# Patient Record
Sex: FEMALE | Race: BLACK OR AFRICAN AMERICAN | Employment: FULL TIME | URBAN - METROPOLITAN AREA
[De-identification: names, ages, dates, MRNs, and addresses within clinical notes are randomized per-mention and may not be internally consistent; named-entity substitution may affect disease eponyms.]

---

## 2019-05-01 ENCOUNTER — HOSPITAL ENCOUNTER (EMERGENCY)
Age: 34
Discharge: HOME | End: 2019-05-01
Attending: EMERGENCY MEDICINE
Payer: COMMERCIAL

## 2019-05-01 ENCOUNTER — APPOINTMENT (OUTPATIENT)
Dept: GENERAL RADIOLOGY | Age: 34
End: 2019-05-01
Payer: COMMERCIAL

## 2019-05-01 ENCOUNTER — TELEPHONE (OUTPATIENT)
Dept: ADMINISTRATIVE | Age: 34
End: 2019-05-01

## 2019-05-01 VITALS
TEMPERATURE: 97.2 F | WEIGHT: 220 LBS | OXYGEN SATURATION: 100 % | HEART RATE: 88 BPM | DIASTOLIC BLOOD PRESSURE: 78 MMHG | HEIGHT: 64 IN | RESPIRATION RATE: 16 BRPM | SYSTOLIC BLOOD PRESSURE: 132 MMHG | BODY MASS INDEX: 37.56 KG/M2

## 2019-05-01 DIAGNOSIS — S62.633B OPEN DISPLACED FRACTURE OF DISTAL PHALANX OF LEFT MIDDLE FINGER, INITIAL ENCOUNTER: Primary | ICD-10-CM

## 2019-05-01 PROCEDURE — 90471 IMMUNIZATION ADMIN: CPT | Performed by: STUDENT IN AN ORGANIZED HEALTH CARE EDUCATION/TRAINING PROGRAM

## 2019-05-01 PROCEDURE — 2500000003 HC RX 250 WO HCPCS: Performed by: STUDENT IN AN ORGANIZED HEALTH CARE EDUCATION/TRAINING PROGRAM

## 2019-05-01 PROCEDURE — 90715 TDAP VACCINE 7 YRS/> IM: CPT | Performed by: STUDENT IN AN ORGANIZED HEALTH CARE EDUCATION/TRAINING PROGRAM

## 2019-05-01 PROCEDURE — 73130 X-RAY EXAM OF HAND: CPT

## 2019-05-01 PROCEDURE — 12001 RPR S/N/AX/GEN/TRNK 2.5CM/<: CPT

## 2019-05-01 PROCEDURE — 99283 EMERGENCY DEPT VISIT LOW MDM: CPT

## 2019-05-01 PROCEDURE — 96374 THER/PROPH/DIAG INJ IV PUSH: CPT

## 2019-05-01 PROCEDURE — 6360000002 HC RX W HCPCS: Performed by: STUDENT IN AN ORGANIZED HEALTH CARE EDUCATION/TRAINING PROGRAM

## 2019-05-01 RX ORDER — BUPIVACAINE HYDROCHLORIDE 5 MG/ML
30 INJECTION, SOLUTION EPIDURAL; INTRACAUDAL ONCE
Status: COMPLETED | OUTPATIENT
Start: 2019-05-01 | End: 2019-05-01

## 2019-05-01 RX ORDER — ONDANSETRON 2 MG/ML
INJECTION INTRAMUSCULAR; INTRAVENOUS
Status: DISCONTINUED
Start: 2019-05-01 | End: 2019-05-01 | Stop reason: HOSPADM

## 2019-05-01 RX ORDER — OXYCODONE HYDROCHLORIDE AND ACETAMINOPHEN 5; 325 MG/1; MG/1
1 TABLET ORAL EVERY 6 HOURS PRN
Qty: 12 TABLET | Refills: 0 | Status: SHIPPED | OUTPATIENT
Start: 2019-05-01 | End: 2019-05-08 | Stop reason: SDUPTHER

## 2019-05-01 RX ORDER — LISINOPRIL AND HYDROCHLOROTHIAZIDE 25; 20 MG/1; MG/1
1 TABLET ORAL DAILY
COMMUNITY

## 2019-05-01 RX ORDER — CEPHALEXIN 500 MG/1
500 CAPSULE ORAL 3 TIMES DAILY
Qty: 21 CAPSULE | Refills: 0 | Status: SHIPPED | OUTPATIENT
Start: 2019-05-01 | End: 2019-05-08

## 2019-05-01 RX ORDER — MORPHINE SULFATE 4 MG/ML
4 INJECTION, SOLUTION INTRAMUSCULAR; INTRAVENOUS ONCE
Status: COMPLETED | OUTPATIENT
Start: 2019-05-01 | End: 2019-05-01

## 2019-05-01 RX ADMIN — BUPIVACAINE HYDROCHLORIDE 150 MG: 5 INJECTION, SOLUTION EPIDURAL; INTRACAUDAL at 08:57

## 2019-05-01 RX ADMIN — TETANUS TOXOID, REDUCED DIPHTHERIA TOXOID AND ACELLULAR PERTUSSIS VACCINE, ADSORBED 0.5 ML: 5; 2.5; 8; 8; 2.5 SUSPENSION INTRAMUSCULAR at 10:01

## 2019-05-01 RX ADMIN — MORPHINE SULFATE 4 MG: 4 INJECTION INTRAVENOUS at 08:52

## 2019-05-01 ASSESSMENT — ENCOUNTER SYMPTOMS
EYE PAIN: 0
COLOR CHANGE: 0
ABDOMINAL PAIN: 0
SHORTNESS OF BREATH: 0

## 2019-05-01 ASSESSMENT — PAIN SCALES - GENERAL
PAINLEVEL_OUTOF10: 10
PAINLEVEL_OUTOF10: 10
PAINLEVEL_OUTOF10: 8

## 2019-05-01 ASSESSMENT — PAIN DESCRIPTION - PAIN TYPE: TYPE: ACUTE PAIN

## 2019-05-01 ASSESSMENT — PAIN DESCRIPTION - LOCATION: LOCATION: HAND

## 2019-05-01 ASSESSMENT — PAIN DESCRIPTION - ORIENTATION: ORIENTATION: LEFT

## 2019-05-01 NOTE — ED NOTES
Pt left ring finger as well as middle finger dressed with vaseline gauze 4x4 and wrapped with kerlix and small finger splint applied to pt middle finger pt tolerated well     Bennie Root RN  05/01/19 [id number]

## 2019-05-01 NOTE — TELEPHONE ENCOUNTER
Patient seen in the ED 5/1 for left hand injury. . Pt to follow up w/ Dr Bishop Covert. Call transferred to the office.  Best number to reach pt at is [phone]

## 2019-05-01 NOTE — ED PROVIDER NOTES
The patient is a 25-year-old female presents today to the ER with chief complaint of left hand pain. 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 She states that she hasn't looked at it since because she is too afraid to but that it hurts very much and that it was bleeding. She states the primary pain is in her left 3rd and 4th digits. Patient states she has no idea when she last received a tetanus shot. The history is provided by the patient. Hand Injury   This is a new problem. The current episode started less than 1 hour ago. The problem occurs constantly. The problem has not changed since onset. Pertinent negatives include no chest pain, no abdominal pain, no headaches and no shortness of breath. Review of Systems   Constitutional: Negative for chills and diaphoresis. HENT: Negative for dental problem. Eyes: Negative for pain. Respiratory: Negative for shortness of breath. Cardiovascular: Negative for chest pain. Gastrointestinal: Negative for abdominal pain. Endocrine: Negative for polyuria. Genitourinary: Negative for dyspareunia. Musculoskeletal: Negative for gait problem. Skin: Negative for color change. Allergic/Immunologic: Negative for immunocompromised state. Neurological: Negative for headaches. Hematological: Negative for adenopathy. Does not bruise/bleed easily. Psychiatric/Behavioral: Negative for behavioral problems. Physical Exam   Constitutional: She is oriented to person, place, and time. She appears well-developed and well-nourished. She is cooperative. HENT:   Head: Normocephalic and atraumatic.    Right Ear: Hearing and external ear normal.   Left Ear: Hearing and external ear normal.   Nose: Nose normal.   Mouth/Throat: Uvula is midline and oropharynx is clear and moist.   Eyes: Conjunctivae and lids are normal.   Neck: Trachea normal and full passive range of motion without pain. Cardiovascular: Normal rate, regular rhythm and normal heart sounds. Pulmonary/Chest: Effort normal and breath sounds normal.   Abdominal: Soft. Normal appearance and bowel sounds are normal. There is no tenderness. Musculoskeletal:        Hands:  Neurological: She is alert and oriented to person, place, and time. GCS eye subscore is 4. GCS verbal subscore is 5. GCS motor subscore is 6. Skin: Skin is warm, dry and intact. Psychiatric: She has a normal mood and affect. Her speech is normal and behavior is normal. Judgment and thought content normal. Cognition and memory are normal.                Procedures  Laceration Repair Procedure Note    Indication: Laceration/crush injury    Procedure: The patient was placed in the appropriate position and anesthesia around the laceration was obtained by infiltration using digital nerve block with 0.5% bupivacaine. The area was then cleansed with betadine and draped in a sterile fashion and irrigated with high-pressure sterile water. The laceration was closed with 5-0 Vicryl using interrupted sutures. There was also injury to the left nailbed, remaining portion of nail was removed. The wound area was then dressed with a sterile dressing and splint. Minimal debridement was preformed, flaps were aligned. No foreign body was identified. Total repaired wound length: 1 cm. Other Items: Suture count: 9    The patient tolerated the procedure well. Complications: None                MDM  Number of Diagnoses or Management Options  Open displaced fracture of distal phalanx of left middle finger, initial encounter:   Diagnosis management comments: Patient's EKG showed Comminuted left third distal phalanx fracture, patient's wounds were cleaned, irrigated, and repaired. Patient was given a tetanus booster. Patient sent home with prescriptions for Keflex and Percocet. Patient given follow-up with orthopedic surgeon.  All patient's questions have been answered at this time. Patient to be discharged home with instructions to follow with the orthopedic surgeon. Patient verbalizes understanding of plan and is agreeable to discharge at this time. --------------------------------------------- PAST HISTORY ---------------------------------------------  Past Medical History:  has a past medical history of Asthma and Hypertension. Past Surgical History:  has a past surgical history that includes LEEP. Social History:  reports that she has been smoking cigarettes. She has been smoking about 0.50 packs per day. She has never used smokeless tobacco. She reports that she drinks alcohol. She reports that she does not use drugs. Family History: family history is not on file. The patients home medications have been reviewed. Allergies: Patient has no known allergies. -------------------------------------------------- RESULTS -------------------------------------------------  Labs:  No results found for this visit on 05/01/19. Radiology:  XR HAND LEFT (MIN 3 VIEWS)   Final Result      Comminuted left third distal phalanx fracture with associated soft   tissue swelling. David Scott ------------------------- NURSING NOTES AND VITALS REVIEWED ---------------------------  Date / Time Roomed:  5/1/2019  8:16 AM  ED Bed Assignment:  23/23    The nursing notes within the ED encounter and vital signs as below have been reviewed. BP (!) 149/83   Pulse 97   Temp 97.1 °F (36.2 °C)   Resp 20   Ht 5' 4\" (1.626 m)   Wt 220 lb (99.8 kg)   LMP 04/12/2019 (Exact Date)   SpO2 100%   BMI 37.76 kg/m²   Oxygen Saturation Interpretation: Normal      ------------------------------------------ PROGRESS NOTES ------------------------------------------  11:02 AM  I have spoken with the patient and discussed todays results, in addition to providing specific details for the plan of care and counseling regarding the diagnosis and prognosis.   Their questions are answered at this time and they are agreeable with the plan. I discussed at length with them reasons for immediate return here for re evaluation. They will followup with their orthopedic physician by calling their office tomorrow. --------------------------------- ADDITIONAL PROVIDER NOTES ---------------------------------  At this time the patient is without objective evidence of an acute process requiring hospitalization or inpatient management. They have remained hemodynamically stable throughout their entire ED visit and are stable for discharge with outpatient follow-up. The plan has been discussed in detail and they are aware of the specific conditions for emergent return, as well as the importance of follow-up. New Prescriptions    CEPHALEXIN (KEFLEX) 500 MG CAPSULE    Take 1 capsule by mouth 3 times daily for 7 days    OXYCODONE-ACETAMINOPHEN (PERCOCET) 5-325 MG PER TABLET    Take 1 tablet by mouth every 6 hours as needed for Pain for up to 3 days. Intended supply: 3 days. Take lowest dose possible to manage pain       Diagnosis:  1. Open displaced fracture of distal phalanx of left middle finger, initial encounter        Disposition:  Patient's disposition: Discharge to home  Patient's condition is stable.               Melba Gomez DO  Resident  05/01/19 0601

## 2019-05-01 NOTE — ED NOTES
PT NOT ABLE TO BE DISCHARGED UNTIL LAB OBTAINS URINE DRUG SCREEN.       Juan Jose Jain RN  05/01/19 3102

## 2019-05-03 ENCOUNTER — TELEPHONE (OUTPATIENT)
Dept: ADMINISTRATIVE | Age: 34
End: 2019-05-03

## 2019-05-03 NOTE — TELEPHONE ENCOUNTER
Patient called back again needing to make her follow up appointment. She didn't want to be transferred again. Please call at [phone].

## 2019-05-07 DIAGNOSIS — S69.92XA HAND INJURY, LEFT, INITIAL ENCOUNTER: Primary | ICD-10-CM

## 2019-05-08 ENCOUNTER — OFFICE VISIT (OUTPATIENT)
Dept: ORTHOPEDIC SURGERY | Age: 34
End: 2019-05-08
Payer: COMMERCIAL

## 2019-05-08 ENCOUNTER — HOSPITAL ENCOUNTER (OUTPATIENT)
Dept: GENERAL RADIOLOGY | Age: 34
Discharge: HOME | End: 2019-05-10
Payer: COMMERCIAL

## 2019-05-08 VITALS
WEIGHT: 220 LBS | BODY MASS INDEX: 38.98 KG/M2 | SYSTOLIC BLOOD PRESSURE: 125 MMHG | HEIGHT: 63 IN | HEART RATE: 63 BPM | DIASTOLIC BLOOD PRESSURE: 76 MMHG

## 2019-05-08 DIAGNOSIS — S62.633B OPEN DISPLACED FRACTURE OF DISTAL PHALANX OF LEFT MIDDLE FINGER, INITIAL ENCOUNTER: ICD-10-CM

## 2019-05-08 DIAGNOSIS — S69.92XA HAND INJURY, LEFT, INITIAL ENCOUNTER: ICD-10-CM

## 2019-05-08 PROCEDURE — 73130 X-RAY EXAM OF HAND: CPT

## 2019-05-08 PROCEDURE — 99212 OFFICE O/P EST SF 10 MIN: CPT

## 2019-05-08 PROCEDURE — 99203 OFFICE O/P NEW LOW 30 MIN: CPT | Performed by: ORTHOPAEDIC SURGERY

## 2019-05-08 PROCEDURE — 26755 TREAT FINGER FRACTURE EACH: CPT | Performed by: ORTHOPAEDIC SURGERY

## 2019-05-08 RX ORDER — IBUPROFEN 800 MG/1
800 TABLET ORAL EVERY 8 HOURS PRN
Qty: 60 TABLET | Refills: 0 | Status: SHIPPED | OUTPATIENT
Start: 2019-05-08 | End: 2019-10-16 | Stop reason: ALTCHOICE

## 2019-05-08 RX ORDER — OXYCODONE HYDROCHLORIDE AND ACETAMINOPHEN 5; 325 MG/1; MG/1
1 TABLET ORAL EVERY 8 HOURS PRN
Qty: 21 TABLET | Refills: 0 | Status: SHIPPED | OUTPATIENT
Start: 2019-05-08 | End: 2019-05-15

## 2019-05-08 RX ORDER — RANITIDINE 150 MG/1
TABLET ORAL
Refills: 1 | COMMUNITY
Start: 2019-02-20

## 2019-05-08 NOTE — PATIENT INSTRUCTIONS
Nonweightbearing to left hand  Warm water soaks with antibacterial soap for 15-20 minutes twice daily, make sure to move hand around in the water  Dry dressings to finger- recommend no more Neosporin.  OK to get dressings wet when taking them off  Finish the remainder of antibiotics  Pain medicine sent to pharmacy  Continue use of splint to left middle finger  Work restriction letter  Follow up in 2 weeks for recheck    If you start having uncontrollable pain, new numbness/tingling, foul smelling drainage or fevers contact the office ASAP or go to the ED for reevaluation

## 2019-05-08 NOTE — PROGRESS NOTES
New Patient Orthopaedic Progress Note    Mike Sepulveda is a 26 y.o. female, her YOB: 1985 with the following history as recorded in [facility]:      Patient Active Problem List    Diagnosis Date Noted    Open displaced fracture of distal phalanx of left middle finger 05/08/2019     Current Outpatient Medications   Medication Sig Dispense Refill    ranitidine (ZANTAC) 150 MG tablet TAKE 1 TABLET BY MOUTH TWICE A DAY  1    oxyCODONE-acetaminophen (PERCOCET) 5-325 MG per tablet Take 1 tablet by mouth every 8 hours as needed for Pain for up to 7 days. Intended supply: 7 days. Take lowest dose possible to manage pain 21 tablet 0    ibuprofen (IBU) 800 MG tablet Take 1 tablet by mouth every 8 hours as needed for Pain Take with food 60 tablet 0    lisinopril-hydrochlorothiazide (PRINZIDE;ZESTORETIC) 20-25 MG per tablet Take 1 tablet by mouth daily      cephALEXin (KEFLEX) 500 MG capsule Take 1 capsule by mouth 3 times daily for 7 days 21 capsule 0    ibuprofen (IBU) 800 MG tablet Take 1 tablet by mouth every 8 hours as needed for Pain for 7 days. 21 tablet 0     No current facility-administered medications for this visit. Allergies: Patient has no known allergies. Past Medical History:   Diagnosis Date    Asthma     Hypertension      Past Surgical History:   Procedure Laterality Date    LEEP       History reviewed. No pertinent family history.   Social History     Tobacco Use    Smoking status: Current Every Day Smoker     Packs/day: 0.50     Types: Cigarettes    Smokeless tobacco: Never Used   Substance Use Topics    Alcohol use: Yes     Comment: occasional                             Chief Complaint   Patient presents with    Finger Injury     left 3rd and 4th fingers injured at work; DOI 5/1/19; steel press came down onto her fingers; machine malfunctioned;     Other     works at [employer]     left 3rd and 5th fingers       SUBJECTIVE: Patient is a 25-year-old status: alert, oriented to person, place, and time, normal mood, behavior, speech, dress, motor activity, and thought processes  Abdomen: soft, nondistended  Resp:   resp easy and unlabored, no audible wheezes note, normal symmetrical expansion of both hemithoraces  Cardiac: distal pulses palpable, skin and extremities well perfused  Neurological: alert, oriented X3, normal speech, no focal findings or movement disorder noted, motor and sensory grossly normal bilaterally, normal muscle tone, no tremors, strength 5/5, normal gait and station  HEENT: normochephalic atraumatic, external ears and eyes normal, sclera normal, neck supple, no nasal discharge. Extremities:   peripheral pulses normal, no edema, redness or tenderness in the calves   Skin: normal coloration, no rashes or open wounds, no suspicious skin lesions noted  Psych: Affect euthymic   Musculoskeletal:   Extremity:  Left Upper Extremity  Skin is clean dry and intact  Mild edema noted over the middle and ring finger DIP  Radial pulse palpable, fingers warm with BCR  Flex/extension intact to wrist, thumb and fingers, known fracture motion at DIP of middle finger and not tested  Subjectively states sensation intact to radial/medial/ulnar distribution  No active bleeding at nail bed  Skin surrounding nail bed mildly macerated see image below    **Informed Consent**    The patient has given verbal consent to have photos taken of left hand and inserted into their medical chart as part of their permanent medical record for purposes of documentation, treatment management and/or medical review.    All Images taken on 5/8/2019  of patient name: Karen Anguiano were transmitted and stored on secured [employer] located within [facility] by a registered [employer] Mobile Application Device               /76 (Site: Right Upper Arm, Position: Sitting, Cuff Size: Medium Adult)   Pulse 63   Ht 5' 3\" (1.6 m)   Wt 220 lb (99.8 kg)   LMP 04/12/2019 BMI 38.97 kg/m²      XR: 5/8/19 x-rays the left hand demonstrate displaced distal phalanx fracture of the middle finger. This does appear to extend into the DIP joint. No new fracture or dislocation noted on x-rays. ASSESSMENT:     Diagnosis Orders   1. Open displaced fracture of distal phalanx of left middle finger, initial encounter  oxyCODONE-acetaminophen (PERCOCET) 5-325 MG per tablet    ibuprofen (IBU) 800 MG tablet       Discussion: Dr. Kathryn Montana and I lengthy discussion with patient regarding her diagnosis, typical prognosis, and expected outcomes. I reviewed the possible complications from the injury itself despite treatment choosen. I also discussed treatment options including nonoperative managements versus surgical management, along with risks and benefits of each. Patient would like to continue with conservative treatment. We did discuss length of time typically off work of at least 4-6 weeks, as patient works at [employer] and utilizes both hands for fine motor tasks. PLAN:  Nonweightbearing to left hand  Warm water soaks with antibacterial soap for 15-20 minutes twice daily, make sure to move hand around in the water  Dry dressings to finger- recommend no more Neosporin. OK to get dressings wet when taking them off  Finish the remainder of antibiotics  Pain medicine sent to pharmacy  Continue use of splint to left middle finger  Work restriction letter provided  Follow up in 2 weeks for recheck    If you start having uncontrollable pain, new numbness/tingling, foul smelling drainage or fevers contact the office ASAP or go to the ED for reevaluation    Controlled Substances Monitoring:     RX Monitoring 5/8/2019   Attestation The Prescription Monitoring Report for this patient was reviewed today.    Chronic Pain Routine Monitoring No signs of potential drug abuse or diversion identified: otherwise, see note documentation     Electronically signed by Antwon Ann MD on 5/8/2019 at 1:30 PM        I have seen and evaluated the patient and agree with the above assessment on today's visit. I have performed the key components of the history and physical examination and concur completely with the findings and plans as documented. Agree with ROS, examination, FMH, PMH, PSH, SocHx, and allergies as above. Patient physically seen and examined. Patient was injured at work on 5/1/2019 when she smashed her left middle finger. She had a nailbed injury repair by the emergency department. She was sent this for definitive management. She also has an open tuft fracture. She is seen along with the PA today in the office. Patient does have coverage of her distal phalanx. On examination her sensation is intact. It appears that she is healing well. We went over treatment plan with her including soaks etc. She'll most like to be off work between 4 and 6 weeks. She understands this. We'll see her back in 2 weeks for wound check. She is agreeable to plan.       Mary Abernathy MD

## 2019-05-08 NOTE — LETTER
[address]  [facility] [address]  Phone: [phone]  Fax: [phone] Wilbert Bentley MD        May 8, 2019     Patient: Mauri Zimmerman   YOB: 1985   Date of Visit: 5/8/2019       To Whom It May Concern: It is my medical opinion that Mauri Zimmerman may return to light duty immediately with the following restrictions: no use of left hand. Must keep splint on left middle finger. To keep left hand clean and dry. If you have any questions or concerns, please don't hesitate to call.     Sincerely,        Martita Quevedo MD

## 2019-05-24 ENCOUNTER — TELEPHONE (OUTPATIENT)
Dept: ADMINISTRATIVE | Age: 34
End: 2019-05-24

## 2019-05-24 NOTE — TELEPHONE ENCOUNTER
[employer] called stating that patient needed a follow up appointment. She needed to be seen around 5/24 but she did not make the appointment. She is scheduled on 6/14 at 8:30.which was next available. If she should be seen sooner, please call her. (this is a worker's comp).

## 2019-05-24 NOTE — TELEPHONE ENCOUNTER
Spoke with pt re appt for 6/14--told her she needs to come in sooner to have her wound checked--appt changed to 5/29--reminded her to make sure she makes appt for f/u prior to leaving office to be sure she gets appt in appropriate time frame

## 2019-05-28 DIAGNOSIS — S62.633D OPEN DISPLACED FRACTURE OF DISTAL PHALANX OF LEFT MIDDLE FINGER WITH ROUTINE HEALING, SUBSEQUENT ENCOUNTER: Primary | ICD-10-CM

## 2019-05-29 ENCOUNTER — HOSPITAL ENCOUNTER (OUTPATIENT)
Dept: GENERAL RADIOLOGY | Age: 34
Discharge: HOME | End: 2019-05-31
Payer: COMMERCIAL

## 2019-05-29 ENCOUNTER — OFFICE VISIT (OUTPATIENT)
Dept: ORTHOPEDIC SURGERY | Age: 34
End: 2019-05-29
Payer: COMMERCIAL

## 2019-05-29 VITALS
WEIGHT: 220 LBS | SYSTOLIC BLOOD PRESSURE: 126 MMHG | TEMPERATURE: 98 F | BODY MASS INDEX: 37.56 KG/M2 | DIASTOLIC BLOOD PRESSURE: 76 MMHG | HEART RATE: 95 BPM | HEIGHT: 64 IN

## 2019-05-29 DIAGNOSIS — S62.633D OPEN DISPLACED FRACTURE OF DISTAL PHALANX OF LEFT MIDDLE FINGER WITH ROUTINE HEALING, SUBSEQUENT ENCOUNTER: Primary | ICD-10-CM

## 2019-05-29 DIAGNOSIS — S62.633D OPEN DISPLACED FRACTURE OF DISTAL PHALANX OF LEFT MIDDLE FINGER WITH ROUTINE HEALING, SUBSEQUENT ENCOUNTER: ICD-10-CM

## 2019-05-29 PROCEDURE — 73130 X-RAY EXAM OF HAND: CPT

## 2019-05-29 PROCEDURE — 99024 POSTOP FOLLOW-UP VISIT: CPT | Performed by: PHYSICIAN ASSISTANT

## 2019-05-29 PROCEDURE — 99212 OFFICE O/P EST SF 10 MIN: CPT

## 2019-05-29 NOTE — PROGRESS NOTES
DOI: 5/1/19 open displaced distal phalanx fracture, non-op    Subjective:  Trish Tate is approximately 4 weeks out from the above mentioned procedure. Continues to do twice daily soaks of the hand, with dressing changes. Remains AlumaFoam splint. Has remained nonweightbearing, continues working light duty. Denies any new complaints with returning back to work with light duty restrictions. Denies new orthopedic planes or paresthesias. Denies fevers or chills. Pain is controlled. Review of Systems -    General ROS: negative for - chills, fatigue, fever or night sweats  Respiratory ROS: no cough, shortness of breath, or wheezing  Cardiovascular ROS: no chest pain or dyspnea on exertion  Gastrointestinal ROS: no abdominal pain, nausea, vomiting, diarrhea, constipation,or black or bloody stools  Genitourinary: no hematuria, dysuria, or incontinence   Musculoskeletal ROS: negative for -back or neck pain or stiffness, also see HPI  Neurological ROS: no TIA or stroke symptoms       Objective:    General: Alert and oriented X 3, normocephalic atraumatic, external ears and eye normal, sclera clear, no acute distress, respirations easy and unlabored with no audible wheezes, skin warm and dry, speech and dress appropriate for noted age, affect euthymic.     Extremity:  Left Upper Extremity  Skin is clean dry and intact  Mild edema noted over the middle and ring finger DIP  Radial pulse palpable, fingers warm with BCR  Flex/extension intact to wrist, thumb and fingers, unable to make a full fist due to pain  FDS/FDP, EDC of the middle and ring finger intact   Subjectively states sensation intact to radial/medial/ulnar distribution  No active bleeding at nail bed, eschar noted over nail bed site healing no signs of infection  Skin surrounding nail bed mildly macerated again today     /76 (Site: Left Upper Arm, Position: Sitting, Cuff Size: Medium Adult)   Pulse 95   Temp 98 °F (36.7 °C) (Oral)   Ht 5' 4\"

## 2019-06-03 ENCOUNTER — HOSPITAL ENCOUNTER (OUTPATIENT)
Dept: OCCUPATIONAL THERAPY | Age: 34
Setting detail: THERAPIES SERIES
Discharge: HOME | End: 2019-06-03
Payer: COMMERCIAL

## 2019-06-03 PROCEDURE — 97140 MANUAL THERAPY 1/> REGIONS: CPT | Performed by: OCCUPATIONAL THERAPIST

## 2019-06-03 PROCEDURE — 97165 OT EVAL LOW COMPLEX 30 MIN: CPT | Performed by: OCCUPATIONAL THERAPIST

## 2019-06-03 PROCEDURE — 97110 THERAPEUTIC EXERCISES: CPT | Performed by: OCCUPATIONAL THERAPIST

## 2019-06-03 NOTE — PROGRESS NOTES
tips     Dynamometer (setting 2): deferred    Left: TBA      Right: TBA    Pinch Meter:   Lateral: Left= TBA, Right= TBA    Palmar 3 point: Left= TBA, Right= TBA  9 Hole Peg Test:   Left: 2 min   Right: WNL    Intervention: soft tissue mobilization, arom, prom, soft tissue mobilization, beginning hep provided. Assessment of current deficits   Functional mobility [x]  ADLs [x] Strength [x]  Cognition []  Functional transfers  [] IADLs [] Safety Awareness [x]  Endurance [x]  Fine Motor Coordination [x] Balance [] Vision/perception [] Sensation [x]   Gross Motor Coordination [x] ROM [x]     Eval Complexity: low  Profile and History- low  Assessment of Occupational Performance and Identification of Deficits- low  Clinical Decision Making- low    Rehab Potential:                                 [x] Good  [] Fair  [] Poor        Suggested Professional Referral:       [x] No  [] Yes:  Barriers to Goal Achievement[de-identified]          [x] No  [] Yes:  Domestic Concerns:                           [x] No  [] Yes:     Goal Formulation: Patient  Time In: 3           Time Out: 4                      Timed Code Treatment Minutes: 60 minutes        PLAN      Treatment to include:   [x] Instruction in HEP                   Modalities:  [x] Therapeutic Exercise [] Ultrasound      [] Electrical Stimulation/Attended  [x] PROM/Stretching                   [x] Fluidotherapy          [x]  Paraffin                   [x]AAROM  [x] AROM             [] Iontophoresis: 4 mg/mL;  Dexamethasone Sodium           [] Desensitization                               [] Neuromuscular Re-education    [x] Splinting       [x] Therapeutic Activity            [] Pain Management with/without modalities PRN                 [x] Manual Therapy/Fascial release        [x] ADL/IADL re-training        [x] Tendon Glides                   []Joint Protection/Training  []Ergonomics                             [x] Joint Mobilization        []Adaptive Equipment Assessment/Training                             [x] Manual Edema Mobilization    [] Energy Conservation/Work Simplification  [x] GM/FM Coordination  [x]  Safety retraining/education per  individual diagnosis/goals    Patient Specific Goal: normal use of the left hand                             GOALS (Long term same as Short term):  1) Patient will demonstrate good understanding of home program (exercises/activities/diagnosis/prognosis/goals) with good accuracy. 2) Patient will demonstrate increased active/passive range of motion of their middle and ring fingers to [facility] for ADL/IADL completion. 3) Patient will demonstrate increased /pinch strength of at least 10 / 5 pinch pounds of their l hand.(when protocol permitting)   4) Patient to report decreased pain in their affected R hand from 0-4/10 to 0-2/10 or less with resistive functional use. 5) Increase in fine motor function as evidenced by decreased time to complete 9-hole peg test and/or MRMT test by at least 10 seconds. 6) Patient to report 100% compliance with their splint wear, care, and precautions if needed. 7) Patient to report a decrease in hypersensitivity from 70% to 20% or less in their left middle , ring tips. 8) Patient will report ADL functions same as prior to diagnosis of finger fx. .       Patient. Education:  [x] Plans/Goals, Risks/Benefits discussed  [x] Home exercise program  Method of Education: [x] Verbal  [] Demo  [] Written  Comprehension of Education:  [x] Verbalizes understanding. [x] Demonstrates understanding. [] Needs Review. [] Demonstrates/verbalizes understanding of HEP/Ed previously given. Patient understands diagnosis/prognosis and consents to treatment, plan and goals: [x] Yes    [] No      Electronically signed by: Britt Nation OT/L, CHT [id number]        Physician's Certification / Comments      Frequency/Duration 2-3 / week for 6-8 visits.    Certification period From: 6/3/19  To: 8/3/19     I have

## 2019-06-10 ENCOUNTER — HOSPITAL ENCOUNTER (OUTPATIENT)
Dept: OCCUPATIONAL THERAPY | Age: 34
Setting detail: THERAPIES SERIES
Discharge: HOME | End: 2019-06-10
Payer: COMMERCIAL

## 2019-06-10 PROCEDURE — 97530 THERAPEUTIC ACTIVITIES: CPT

## 2019-06-10 PROCEDURE — 97110 THERAPEUTIC EXERCISES: CPT

## 2019-06-10 PROCEDURE — 97140 MANUAL THERAPY 1/> REGIONS: CPT

## 2019-06-10 NOTE — PROGRESS NOTES
OCCUPATIONAL THERAPY PROGRESS NOTE      [facility]   Phone: [phone]   Fax: [phone]     Date:  6/10/2019  Initial Evaluation Date:  6/3/2019    Patient Name:  Mary Grigsby    :  1985  Restrictions/Precautions:  Limited WB, 1#, , low fall risk  Diagnosis:  Open displaced fx of distal phalanx of left middle finger with routine healing (S62.633D)                                                       Insurance/Certification information:  Generic Fernando French one [id number]  Referring Practitioner:  Marycarmen Malhotra PA-C  Date of Surgery/Injury: 19   5 weeks out at time of eval.   Plan of care signed (Y/N):  no  Visit# / total visits:      Pain Level: 6 on scale of 1-10, with paresthesia , aching, needle pin, stabbing    Subjective: \"I can't wear those caps she gave me. \"  Requested patient bring in 'caps' to discuss use and care. States she is not taking pain medication. I can't take pain medication because I am working and they make me tired. A tylenol will help for about 6-8 hours. Objective:  Engaged patient in the following with focus on ROM, edema control. INTERVENTION: COMPLETED REPS/TIME: SPECIFICS/COMMENTS:   Modality:      Fluidotherapy      Paraffin      MHP x 10 During SROM   Estim      Manual Therapy:      Scar Massage      Soft Tissue: x 10    Therapeutic Ex/Activities: Towel task x     Pinch x  Cotton balls   wristosizer x                                                     ROM: x 20 min AAROM, PROM, AROM               Other:                    Assessment: Patient shows potential to progress with stated goals and will be educated on HEP and provided written handouts as needed throughout their care. Pt is making Fair progress toward stated plan of care.    -Rehab Potential: Good  -Requires OT Follow Up: Yes  -Time In: 330  -Time Out: 430   Timed Treatment Minutes: 55 Minutes  Goals: Goals for pt can be see on initial eval occurring on 6/3/2019    Plan:   [x]  Continues Plan of care: Treatment delivered based on POC and graduated to patient's progress. Patient education continues at each visit to obtain maximum benefit from skilled OT intervention.   []  Alter Plan of care:   []  Discharge:      MELISSA Richardson/L, [id number]

## 2019-06-13 ENCOUNTER — HOSPITAL ENCOUNTER (OUTPATIENT)
Dept: OCCUPATIONAL THERAPY | Age: 34
Setting detail: THERAPIES SERIES
Discharge: HOME | End: 2019-06-13
Payer: COMMERCIAL

## 2019-06-13 PROCEDURE — 97530 THERAPEUTIC ACTIVITIES: CPT

## 2019-06-13 PROCEDURE — 97140 MANUAL THERAPY 1/> REGIONS: CPT

## 2019-06-13 PROCEDURE — 97110 THERAPEUTIC EXERCISES: CPT

## 2019-06-13 PROCEDURE — 97022 WHIRLPOOL THERAPY: CPT

## 2019-06-13 NOTE — PROGRESS NOTES
OCCUPATIONAL THERAPY PROGRESS NOTE      [facility]   Phone: [phone]   Fax: [phone]     Date:  2019  Initial Evaluation Date:  6/3/2019    Patient Name:  Erin Hernandez    :  1985  Restrictions/Precautions:  Limited WB, 1#, , low fall risk  Diagnosis:  Open displaced fx of distal phalanx of left middle finger with routine healing (S62.633D)                                                       Insurance/Certification information:  Generic [employer] Ar[employer] one [id number]  Referring Practitioner:  Shady Whalen PA-C  Date of Surgery/Injury: 19   5 weeks out at time of eval.   Plan of care signed (Y/N):  no  Visit# / total visits: 3 / 18     Pain Level: 6 on scale of 1-10, with paresthesia , aching, needle pin, stabbing    Subjective: No new complaints. \"My fingers are still numb. \" Pain is tolerable. Per her report she has been compliant to HEP. Objective:  Engaged patient in the following with focus on ROM, edema control. INTERVENTION: COMPLETED REPS/TIME: SPECIFICS/COMMENTS:   Modality:      Fluidotherapy x 15 min    Paraffin      MHP  10 During SROM   Estim      Manual Therapy:      Scar Massage      Soft Tissue: x 10    Therapeutic Ex/Activities: Towel task x     Pinch and release and pickup x  Cotton balls   wristosizer x                                                     ROM: x 20 min AAROM, PROM, AROM tendon glides               Other:      Coban Wrap x  Patient education on wear/care           Assessment: Patient shows potential to progress with stated goals and will be educated on HEP and provided written handouts as needed throughout their care. Pt is making Fair progress toward stated plan of care.    -Rehab Potential: Good  -Requires OT Follow Up: Yes  -Time In: 330  -Time Out: 430   Timed Treatment Minutes: 55 Minutes  Goals: Goals for pt can be see on initial eval occurring on 6/3/2019    Plan:   [x] Continues Plan of care: Treatment delivered based on POC and graduated to patient's progress. Patient education continues at each visit to obtain maximum benefit from skilled OT intervention.   []  Alter Plan of care:   []  Discharge:      MELISSA Deluna/SUMAN, [id number]

## 2019-06-14 DIAGNOSIS — S62.633D OPEN DISPLACED FRACTURE OF DISTAL PHALANX OF LEFT MIDDLE FINGER WITH ROUTINE HEALING, SUBSEQUENT ENCOUNTER: Primary | ICD-10-CM

## 2019-06-17 ENCOUNTER — HOSPITAL ENCOUNTER (OUTPATIENT)
Dept: OCCUPATIONAL THERAPY | Age: 34
Setting detail: THERAPIES SERIES
Discharge: HOME | End: 2019-06-17
Payer: COMMERCIAL

## 2019-06-17 ENCOUNTER — HOSPITAL ENCOUNTER (OUTPATIENT)
Dept: GENERAL RADIOLOGY | Age: 34
Discharge: HOME | End: 2019-06-19
Payer: COMMERCIAL

## 2019-06-17 ENCOUNTER — OFFICE VISIT (OUTPATIENT)
Dept: ORTHOPEDIC SURGERY | Age: 34
End: 2019-06-17
Payer: COMMERCIAL

## 2019-06-17 VITALS
SYSTOLIC BLOOD PRESSURE: 132 MMHG | HEART RATE: 76 BPM | BODY MASS INDEX: 38.98 KG/M2 | HEIGHT: 63 IN | DIASTOLIC BLOOD PRESSURE: 87 MMHG | WEIGHT: 220 LBS

## 2019-06-17 DIAGNOSIS — S62.633D OPEN DISPLACED FRACTURE OF DISTAL PHALANX OF LEFT MIDDLE FINGER WITH ROUTINE HEALING, SUBSEQUENT ENCOUNTER: ICD-10-CM

## 2019-06-17 DIAGNOSIS — S62.633D OPEN DISPLACED FRACTURE OF DISTAL PHALANX OF LEFT MIDDLE FINGER WITH ROUTINE HEALING, SUBSEQUENT ENCOUNTER: Primary | ICD-10-CM

## 2019-06-17 PROCEDURE — 99212 OFFICE O/P EST SF 10 MIN: CPT

## 2019-06-17 PROCEDURE — 97140 MANUAL THERAPY 1/> REGIONS: CPT

## 2019-06-17 PROCEDURE — 97530 THERAPEUTIC ACTIVITIES: CPT

## 2019-06-17 PROCEDURE — 97110 THERAPEUTIC EXERCISES: CPT

## 2019-06-17 PROCEDURE — 99024 POSTOP FOLLOW-UP VISIT: CPT | Performed by: PHYSICIAN ASSISTANT

## 2019-06-17 PROCEDURE — 73130 X-RAY EXAM OF HAND: CPT

## 2019-06-17 NOTE — PROGRESS NOTES
DOI: 5/1/19 open displaced distal phalanx fracture, non-op    Subjective:  Kandi Olson is approximately 4 weeks out from the above mentioned procedure. Continues soaks of the hand. Remains in [employer] splint for comfort and at work. Continues light duty, denies complaints. Denies new orthopedic complaints or paresthesias. Pain is controlled overall. Still limits her weightbearing. Denies fevers or chills. Continues with occupational therapy. Review of Systems -    General ROS: negative for - chills, fatigue, fever or night sweats  Respiratory ROS: no cough, shortness of breath, or wheezing  Cardiovascular ROS: no chest pain or dyspnea on exertion  Gastrointestinal ROS: no abdominal pain, nausea, vomiting, diarrhea, constipation,or black or bloody stools  Genitourinary: no hematuria, dysuria, or incontinence   Musculoskeletal ROS: negative for -back or neck pain or stiffness, also see HPI  Neurological ROS: no TIA or stroke symptoms       Objective:    General: Alert and oriented X 3, normocephalic atraumatic, external ears and eye normal, sclera clear, no acute distress, respirations easy and unlabored with no audible wheezes, skin warm and dry, speech and dress appropriate for noted age, affect euthymic. Extremity:  Left Upper Extremity  Skin is clean dry and intact  Mild edema noted over the middle and ring finger DIP, improved today  Radial pulse palpable, fingers warm with BCR  Flex/extension intact to wrist, thumb and fingers, almost able to make a full fist with 3rd and 4th finger incorporated. No pain with ROM.   FDS/FDP, EDC of the middle and ring finger intact   Subjectively states sensation intact to radial/medial/ulnar distribution  eschar noted over nail bed site, healing well no signs of infection      /87 (Site: Left Upper Arm, Position: Sitting, Cuff Size: Large Adult) Comment: did not take bp med today  Pulse 76   Ht 5' 3\" (1.6 m)   Wt 220 lb (99.8 kg)   BMI 38.97 kg/m² XR:  Xrays of the left hand demonstrating a distal phalange fracture, no change in fracture alignment. Minimal interval healing appreciated at this time. No acute abnormalities. Assessment:   Diagnosis Orders   1.  Open displaced fracture of distal phalanx of left middle finger with routine healing, subsequent encounter  [address] , [facility], [place] [id number]       Plan:  WB:  limited weight no more than 10lbs in both hands  Fitted for new stack splint , splint continue at work and for comfort  Continue OT, new script provided  Okay to continue soaks of hand  Follow up in 6 weeks for recheck and xrays, discuss returning back to work full duty at that time    Electronically signed by Enedina Layne PA-C on 6/17/2019 at 3:21 PM

## 2019-06-17 NOTE — PROGRESS NOTES
OCCUPATIONAL THERAPY PROGRESS NOTE      [facility]   Phone: [phone]   Fax: [phone]     Date:  2019  Initial Evaluation Date:  6/3/2019    Patient Name:  Umang Olivas    :  1985  Restrictions/Precautions:  Limited WB, 1#, , low fall risk  Diagnosis:  Open displaced fx of distal phalanx of left middle finger with routine healing (S62.633D)                                                       Insurance/Certification information:  Generic [employer], comp one [id number]  Referring Practitioner:  Spike Chen PA-C  Date of Surgery/Injury: 19   5 weeks out at time of eval.   Plan of care signed (Y/N):  no  Visit# / total visits:      Pain Level: 4-5 on scale of 1-10, with paresthesia , aching, needle pin, stabbing    Subjective: Seen 1/2 scheduled visits. CC is swelling at PIP and numbness at finger tip of 3rd finger. Objective:  Engaged patient in the following with focus on ROM, edema control. INTERVENTION: COMPLETED REPS/TIME: SPECIFICS/COMMENTS:   Modality:      Fluidotherapy x 15 min    Paraffin      MHP  10 During SROM   Estim      Manual Therapy:      Scar Massage      Soft Tissue: x 10    Therapeutic Ex/Activities: Towel task x     Pinch and release and pickup x  Cotton balls   wristosizer x                                                     ROM: x 20 min AAROM, PROM, AROM tendon glides               Other:      Coban Wrap x  Patient education on wear/care she is to wrap the entire finger           Assessment: Patient shows potential to progress with stated goals and will be educated on HEP and provided written handouts as needed throughout their care. Pt is making Fair progress toward stated plan of care.    -Rehab Potential: Good  -Requires OT Follow Up: Yes  -Time In: 330  -Time Out: 430   Timed Treatment Minutes: 55 Minutes  Goals: Goals for pt can be see on initial eval occurring on 6/3/2019    Plan:   [x]  Continues Plan of care: Treatment delivered based on POC and graduated to patient's progress. Patient education continues at each visit to obtain maximum benefit from skilled OT intervention.   []  Alter Plan of care:   []  Discharge:      NENITA Gipson, [id number]

## 2019-06-20 ENCOUNTER — HOSPITAL ENCOUNTER (OUTPATIENT)
Dept: OCCUPATIONAL THERAPY | Age: 34
Setting detail: THERAPIES SERIES
Discharge: HOME | End: 2019-06-20
Payer: COMMERCIAL

## 2019-06-20 PROCEDURE — 97110 THERAPEUTIC EXERCISES: CPT

## 2019-06-20 PROCEDURE — 97140 MANUAL THERAPY 1/> REGIONS: CPT

## 2019-06-20 PROCEDURE — 97530 THERAPEUTIC ACTIVITIES: CPT

## 2019-06-20 PROCEDURE — 97022 WHIRLPOOL THERAPY: CPT

## 2019-06-20 NOTE — PROGRESS NOTES
OCCUPATIONAL THERAPY PROGRESS NOTE      [facility]   Phone: [phone]   Fax: [phone]     Date:  2019  Initial Evaluation Date:  6/3/2019    Patient Name:  Kamar Whalen    :  1985  Restrictions/Precautions:  Limited WB, 1#, , low fall risk  Diagnosis:  Open displaced fx of distal phalanx of left middle finger with routine healing (S62.633D)                                                       Insurance/Certification information:  Generic Ruthie Boas one [id number]  Referring Practitioner:  Reola Scheuermann, PA-C  Date of Surgery/Injury: 19   5 weeks out at time of eval.   Plan of care signed (Y/N):  no  Visit# / total visits:      Pain Level: 4-5 on scale of 1-10, with paresthesia , aching, needle pin, stabbing    Subjective: Seen 2/2 scheduled visits. Feels she is about the same today. CC is swelling at PIP and numbness at finger tip of 3rd finger. ROM of DIPs have improved. Objective:  Engaged patient in the following with focus on ROM, edema control. INTERVENTION: COMPLETED REPS/TIME: SPECIFICS/COMMENTS:   Modality:      Fluidotherapy x 15 min    Paraffin      MHP  10 During SROM   Estim      Manual Therapy:      Scar Massage      Soft Tissue: x 10    Therapeutic Ex/Activities: Towel task x     Pinch and release and pickup x  Cotton balls   wristosizer x     FM x 10 min Graded particle manipulation                                             ROM: x 20 min AAROM, PROM, AROM tendon glides               Other:      Coban Wrap x  Patient education on wear/care she is to wrap the entire finger           Assessment: Patient shows potential to progress with stated goals and will be educated on HEP and provided written handouts as needed throughout their care. Pt is making Fair progress toward stated plan of care.    -Rehab Potential: Good  -Requires OT Follow Up: Yes  -Time In: 330  -Time Out: 430   Timed Treatment Minutes: 55 Minutes    Goals:   1) Patient will demonstrate good understanding of home program (exercises/activities/diagnosis/prognosis/goals) with good accuracy. 2) Patient will demonstrate increased active/passive range of motion of their middle and ring fingers to [facility] for ADL/IADL completion. 3) Patient will demonstrate increased /pinch strength of at least 10 / 5 pinch pounds of their l hand.(when protocol permitting)   4) Patient to report decreased pain in their affected R hand from 0-4/10 to 0-2/10 or less with resistive functional use. 5) Increase in fine motor function as evidenced by decreased time to complete 9-hole peg test and/or MRMT test by at least 10 seconds. 6) Patient to report 100% compliance with their splint wear, care, and precautions if needed. 7) Patient to report a decrease in hypersensitivity from 70% to 20% or less in their left middle , ring tips. 8) Patient will report ADL functions same as prior to diagnosis of finger fx. .     Plan:   [x]  Continues Plan of care: Treatment delivered based on POC and graduated to patient's progress. Patient education continues at each visit to obtain maximum benefit from skilled OT intervention.   []  Alter Plan of care:   []  Discharge:    MELISSA Remy/SUMAN, [id number]

## 2019-06-24 ENCOUNTER — HOSPITAL ENCOUNTER (OUTPATIENT)
Dept: OCCUPATIONAL THERAPY | Age: 34
Setting detail: THERAPIES SERIES
Discharge: HOME | End: 2019-06-24
Payer: COMMERCIAL

## 2019-06-24 PROCEDURE — 97110 THERAPEUTIC EXERCISES: CPT

## 2019-06-24 PROCEDURE — 97530 THERAPEUTIC ACTIVITIES: CPT

## 2019-06-24 PROCEDURE — 97140 MANUAL THERAPY 1/> REGIONS: CPT

## 2019-06-24 NOTE — PROGRESS NOTES
OCCUPATIONAL THERAPY PROGRESS NOTE      [facility]   Phone: [phone]   Fax: [phone]     Date:  2019  Initial Evaluation Date:  6/3/2019    Patient Name:  Donato Monae    :  1985  Restrictions/Precautions:  Limited WB, 1#, , low fall risk  Diagnosis:  Open displaced fx of distal phalanx of left middle finger with routine healing (S62.633D)                                                       Insurance/Certification information:  Generic [facility], comp one [id number]  Referring Practitioner:  Carrol Bazzi PA-C  Date of Surgery/Injury: 19   5 weeks out at time of eval.   Plan of care signed (Y/N):  no  Visit# / total visits:      Pain Level: 4-5 on scale of 1-10, with paresthesia , aching, needle pin, stabbing    Subjective: Seen 1/2 scheduled visits. Feels she is about the same today. ROM of DIPs have improved. Objective:  Engaged patient in the following with focus on ROM, edema control. INTERVENTION: COMPLETED REPS/TIME: SPECIFICS/COMMENTS:   Modality:      Fluidotherapy x 15 min    Paraffin      MHP  10 During SROM   Estim      Manual Therapy:      Scar Massage      Soft Tissue: x 10    Therapeutic Ex/Activities: Towel task x     Pinch and release and pickup x  Cotton balls   wristosizer x     FM x 10 min Graded particle manipulation                                             ROM: x 20 min AAROM, PROM, AROM tendon glides               Other:      Coban Wrap x  Patient education on wear/care she is to wrap the entire finger           Assessment: Patient shows potential to progress with stated goals and will be educated on HEP and provided written handouts as needed throughout their care. Pt is making Fair progress toward stated plan of care.    -Rehab Potential: Good  -Requires OT Follow Up: Yes  -Time In: 330  -Time Out: 430   Timed Treatment Minutes: 55 Minutes    Goals:   1) Patient will demonstrate good understanding of home program (exercises/activities/diagnosis/prognosis/goals) with good accuracy. 2) Patient will demonstrate increased active/passive range of motion of their middle and ring fingers to [facility] for ADL/IADL completion. Middle DIP = 29' from 24'   Initial = 0'  3) Patient will demonstrate increased /pinch strength of at least 10 / 5 pinch pounds of their l hand.(when protocol permitting)   4) Patient to report decreased pain in their affected R hand from 0-4/10 to 0-2/10 or less with resistive functional use. 5) Increase in fine motor function as evidenced by decreased time to complete 9-hole peg test and/or MRMT test by at least 10 seconds. 6) Patient to report 100% compliance with their splint wear, care, and precautions if needed. 7) Patient to report a decrease in hypersensitivity from 70% to 20% or less in their left middle , ring tips. 8) Patient will report ADL functions same as prior to diagnosis of finger fx. .     Plan:   [x]  Continues Plan of care: Treatment delivered based on POC and graduated to patient's progress. Patient education continues at each visit to obtain maximum benefit from skilled OT intervention.   []  Alter Plan of care:   []  Discharge:    NENITA Malhotra, [id number]

## 2019-06-27 ENCOUNTER — HOSPITAL ENCOUNTER (OUTPATIENT)
Dept: OCCUPATIONAL THERAPY | Age: 34
Setting detail: THERAPIES SERIES
Discharge: HOME | End: 2019-06-27
Payer: COMMERCIAL

## 2019-06-27 PROCEDURE — 97110 THERAPEUTIC EXERCISES: CPT

## 2019-06-27 PROCEDURE — 97530 THERAPEUTIC ACTIVITIES: CPT

## 2019-06-27 PROCEDURE — 97140 MANUAL THERAPY 1/> REGIONS: CPT

## 2019-06-27 NOTE — PROGRESS NOTES
OCCUPATIONAL THERAPY PROGRESS NOTE      [facility]   Phone: [phone]   Fax: [phone]     Date:  2019  Initial Evaluation Date:  6/3/2019    Patient Name:  Dillan Bell    :  1985  Restrictions/Precautions:  Limited WB, 1#, , low fall risk  Diagnosis:  Open displaced fx of distal phalanx of left middle finger with routine healing (S62.633D)                                                       Insurance/Certification information:  Generic [facility], comp one [id number]  Referring Practitioner:  Baldo Oh PA-C  Date of Surgery/Injury: 19   5 weeks out at time of eval.   Plan of care signed (Y/N):  no  Visit# / total visits:      Pain Level: 4-5 on scale of 1-10, with paresthesia , aching, needle pin, stabbing    Subjective: Seen 2/2 scheduled visits. Feels she is about the same today. ROM of DIPs have improved. Guards tip of middle finger. Objective:  Engaged patient in the following with focus on ROM, edema control. INTERVENTION: COMPLETED REPS/TIME: SPECIFICS/COMMENTS:   Modality:      Fluidotherapy x 15 min    Paraffin      MHP  10 During SROM   Estim      Manual Therapy:      Scar Massage      Soft Tissue:  10    Therapeutic Ex/Activities: Towel task x     Pinch and release and pickup x  Cotton balls   wristosizer x     FM x 10 min Graded particle manipulation   Desentization x 15 min                                        ROM: x 20 min AAROM, PROM, AROM tendon glides               Other:      Coban Wrap x  Patient education on wear/care she is to wrap the entire finger           Assessment: Patient shows potential to progress with stated goals and will be educated on HEP and provided written handouts as needed throughout their care. Pt is making Fair progress toward stated plan of care.    -Rehab Potential: Good  -Requires OT Follow Up: Yes  -Time In: 330  -Time Out: 430   Timed Treatment Minutes: 55

## 2019-07-01 ENCOUNTER — HOSPITAL ENCOUNTER (OUTPATIENT)
Dept: OCCUPATIONAL THERAPY | Age: 34
Setting detail: THERAPIES SERIES
Discharge: HOME | End: 2019-07-01
Payer: COMMERCIAL

## 2019-07-01 PROCEDURE — 97110 THERAPEUTIC EXERCISES: CPT

## 2019-07-01 PROCEDURE — 97530 THERAPEUTIC ACTIVITIES: CPT

## 2019-07-01 PROCEDURE — 97140 MANUAL THERAPY 1/> REGIONS: CPT

## 2019-07-01 NOTE — PROGRESS NOTES
OCCUPATIONAL THERAPY PROGRESS NOTE      [facility]   Phone: [phone]   Fax: [phone]     Date:  2019  Initial Evaluation Date:  6/3/2019    Patient Name:  Roxie Hilton    :  1985  Restrictions/Precautions:  Limited WB, 1#, , low fall risk  Diagnosis:  Open displaced fx of distal phalanx of left middle finger with routine healing (S62.633D)                                                       Insurance/Certification information:  Generic [facility], comp one [id number]  Referring Practitioner:  Balwinder Melvin PA-C  Date of Surgery/Injury: 19   5 weeks out at time of eval.   Plan of care signed (Y/N):  no  Visit# / total visits:      Pain Level: 4-5 on scale of 1-10, with paresthesia , aching, needle pin, stabbing    Subjective: Seen 1 / 2 scheduled visits. Feels she is about the same today. ROM of DIPs have improved. Guards tip of middle finger. Scab is gradually falling off. Objective:  Engaged patient in the following with focus on ROM, edema control. INTERVENTION: COMPLETED REPS/TIME: SPECIFICS/COMMENTS:   Modality:      Fluidotherapy x 15 min    Paraffin      MHP  10 During SROM   Estim      Manual Therapy:      Scar Massage      Soft Tissue:  10    Therapeutic Ex/Activities: Towel task x     Pinch and release and pickup x  Cotton balls   wristosizer x     FM x 10 min Graded particle manipulation   Desentization x 15 min                                        ROM: x 20 min AAROM, PROM, AROM tendon glides               Other:      Coban Wrap x  Patient education on wear/care she is to wrap the entire finger           Assessment: Patient shows potential to progress with stated goals and will be educated on HEP and provided written handouts as needed throughout their care. Pt is making Fair progress toward stated plan of care.    -Rehab Potential: Good  -Requires OT Follow Up: Yes  -Time In: 330  -Time Out: 430

## 2019-07-08 ENCOUNTER — HOSPITAL ENCOUNTER (OUTPATIENT)
Dept: OCCUPATIONAL THERAPY | Age: 34
Setting detail: THERAPIES SERIES
Discharge: HOME | End: 2019-07-08
Payer: COMMERCIAL

## 2019-07-08 PROCEDURE — 97140 MANUAL THERAPY 1/> REGIONS: CPT

## 2019-07-08 PROCEDURE — 97110 THERAPEUTIC EXERCISES: CPT

## 2019-07-08 PROCEDURE — 97530 THERAPEUTIC ACTIVITIES: CPT

## 2019-07-11 ENCOUNTER — HOSPITAL ENCOUNTER (OUTPATIENT)
Dept: OCCUPATIONAL THERAPY | Age: 34
Setting detail: THERAPIES SERIES
Discharge: HOME | End: 2019-07-11
Payer: COMMERCIAL

## 2019-07-11 PROCEDURE — 97140 MANUAL THERAPY 1/> REGIONS: CPT

## 2019-07-11 PROCEDURE — 97110 THERAPEUTIC EXERCISES: CPT

## 2019-07-11 PROCEDURE — 97530 THERAPEUTIC ACTIVITIES: CPT

## 2019-07-11 NOTE — PROGRESS NOTES
plan of care. -Rehab Potential: Good  -Requires OT Follow Up: Yes  -Time In: 330  -Time Out: 430   Timed Treatment Minutes: 55 Minutes    Goals:   1) Patient will demonstrate good understanding of home program (exercises/activities/diagnosis/prognosis/goals) with good accuracy. 2) Patient will demonstrate increased active/passive range of motion of their middle and ring fingers to [facility] for ADL/IADL completion. Middle DIP = 29' from 24'   Initial = 0'  3) Patient will demonstrate increased /pinch strength of at least 10 / 5 pinch pounds of their l hand.(when protocol permitting)   4) Patient to report decreased pain in their affected R hand from 0-4/10 to 0-2/10 or less with resistive functional use. 5) Increase in fine motor function as evidenced by decreased time to complete 9-hole peg test and/or MRMT test by at least 10 seconds. 6) Patient to report 100% compliance with their splint wear, care, and precautions if needed. 7) Patient to report a decrease in hypersensitivity from 70% to 20% or less in their left middle , ring tips. 8) Patient will report ADL functions same as prior to diagnosis of finger fx. .     Plan:   [x]  Continues Plan of care: Treatment delivered based on POC and graduated to patient's progress. Patient education continues at each visit to obtain maximum benefit from skilled OT intervention.   []  Alter Plan of care:   []  Discharge:    MELISSA Starr/SUMAN, [id number]

## 2019-07-15 ENCOUNTER — HOSPITAL ENCOUNTER (OUTPATIENT)
Dept: OCCUPATIONAL THERAPY | Age: 34
Setting detail: THERAPIES SERIES
Discharge: HOME | End: 2019-07-15
Payer: COMMERCIAL

## 2019-07-15 PROCEDURE — 97110 THERAPEUTIC EXERCISES: CPT

## 2019-07-15 PROCEDURE — 97530 THERAPEUTIC ACTIVITIES: CPT

## 2019-07-15 PROCEDURE — 97140 MANUAL THERAPY 1/> REGIONS: CPT

## 2019-07-18 ENCOUNTER — HOSPITAL ENCOUNTER (OUTPATIENT)
Dept: OCCUPATIONAL THERAPY | Age: 34
Setting detail: THERAPIES SERIES
Discharge: HOME | End: 2019-07-18
Payer: COMMERCIAL

## 2019-07-18 PROCEDURE — 97140 MANUAL THERAPY 1/> REGIONS: CPT

## 2019-07-18 PROCEDURE — 97110 THERAPEUTIC EXERCISES: CPT

## 2019-07-18 PROCEDURE — 97530 THERAPEUTIC ACTIVITIES: CPT

## 2019-07-24 ENCOUNTER — HOSPITAL ENCOUNTER (OUTPATIENT)
Dept: OCCUPATIONAL THERAPY | Age: 34
Setting detail: THERAPIES SERIES
Discharge: HOME | End: 2019-07-24
Payer: COMMERCIAL

## 2019-07-24 PROCEDURE — 97140 MANUAL THERAPY 1/> REGIONS: CPT

## 2019-07-24 PROCEDURE — 97110 THERAPEUTIC EXERCISES: CPT

## 2019-07-24 PROCEDURE — 97530 THERAPEUTIC ACTIVITIES: CPT

## 2019-07-24 NOTE — PROGRESS NOTES
OCCUPATIONAL THERAPY PROGRESS NOTE      [facility]   Phone: [phone]   Fax: [phone]     Date:  2019  Initial Evaluation Date:  6/3/2019    Patient Name:  Roseline Chang    :  1985  Restrictions/Precautions:  Limited WB, 1#, , low fall risk  Diagnosis:  Open displaced fx of distal phalanx of left middle finger with routine healing (S62.633D)                                                       Insurance/Certification information:  [employer] one [id number]  Referring Practitioner:  Nicho Leslie PA-C  Date of Surgery/Injury: 19   5 weeks out at time of eval.   Plan of care signed (Y/N):  no  Visit# / total visits:  to 2019 TO 2019  Missed Visits: 0     Pain Level: 1-2 on scale of 1-10, with paresthesia , aching, needle pin, stabbing  Increases to 4-5/10 if bumped or rolled on in the night. Subjective:  Seen 1/2 scheduled visits. New C9 received today. Objective:  Engaged patient in the following with focus on ROM, edema control, desentization. Debrided as able. INTERVENTION: COMPLETED REPS/TIME: SPECIFICS/COMMENTS:   Modality:      Fluidotherapy x 15 min    Paraffin      MHP  10 During SROM   Estim      Manual Therapy:      Scar Massage x 8 Assessment of digit caps utilized at this time for work. Soft Tissue:  10    Therapeutic Ex/Activities:             Towel task x     Pinch and release and find pickup x 10 min Cotton balls/sponges   wristosizer      FM x 10 min Graded particle manipulation   Desentization x 10 min    Putty and beads  10 min Pinch with middle finger and thumb to locate beads for touch   Rice and item task for desentization  15 min                            ROM:  20 min AAROM, PROM, AROM tendon glides               Other:      Coban Wrap   Patient education on wear/care she is to wrap the entire finger   Stretch digit sleeve x  Fitted patient for new compression cap

## 2019-07-25 ENCOUNTER — HOSPITAL ENCOUNTER (OUTPATIENT)
Dept: OCCUPATIONAL THERAPY | Age: 34
Setting detail: THERAPIES SERIES
Discharge: HOME | End: 2019-07-25
Payer: COMMERCIAL

## 2019-07-25 PROCEDURE — 97140 MANUAL THERAPY 1/> REGIONS: CPT

## 2019-07-25 PROCEDURE — 97110 THERAPEUTIC EXERCISES: CPT

## 2019-07-25 PROCEDURE — 97530 THERAPEUTIC ACTIVITIES: CPT

## 2019-07-25 NOTE — PROGRESS NOTES
entire finger   Stretch digit sleeve x  Fitted patient for new compression cap     Assessment: Patient shows potential to progress with stated goals and will be educated on HEP and provided written handouts as needed throughout their care. Pt is making Fair progress toward stated plan of care. -Rehab Potential: Good  -Requires OT Follow Up: Yes  -Time In: 300  -Time Out: 400   Timed Treatment Minutes: 55 Minutes    Goals:   1) Patient will demonstrate good understanding of home program (exercises/activities/diagnosis/prognosis/goals) with good accuracy. Progressing  2) Patient will demonstrate increased active/passive range of motion of their middle and ring fingers to [facility] for ADL/IADL completion. Middle DIP flexion =  45'(55)    Initial = 0'  3) Patient will demonstrate increased /pinch strength of at least 10 / 5 pinch pounds of their l hand.(when protocol permitting)    TBA  4) Patient to report decreased pain in their affected R hand from 0-4/10 to 0-2/10 or less with resistive functional use. Progressing slowly  5) Increase in fine motor function as evidenced by decreased time to complete 9-hole peg test and/or MRMT test by at least 10 seconds. Progressing   6) Patient to report 100% compliance with their splint wear, care, and precautions if needed. Progressing  7) Patient to report a decrease in hypersensitivity from 70% to 20% or less in their left middle , ring tips. Slow Progress  8) Patient will report ADL functions same as prior to diagnosis of finger fx. TBA     Plan:   [x]  Continues Plan of care: Treatment delivered based on POC and graduated to patient's progress. Patient education continues at each visit to obtain maximum benefit from skilled OT intervention.     []  Alter Plan of Care:  []  Discharge:    MELISSA Miles/SUMAN, [id number]

## 2019-07-26 DIAGNOSIS — S62.633D OPEN DISPLACED FRACTURE OF DISTAL PHALANX OF LEFT MIDDLE FINGER WITH ROUTINE HEALING, SUBSEQUENT ENCOUNTER: Primary | ICD-10-CM

## 2019-07-29 ENCOUNTER — HOSPITAL ENCOUNTER (OUTPATIENT)
Dept: OCCUPATIONAL THERAPY | Age: 34
Setting detail: THERAPIES SERIES
Discharge: HOME | End: 2019-07-29
Payer: COMMERCIAL

## 2019-07-29 ENCOUNTER — HOSPITAL ENCOUNTER (OUTPATIENT)
Dept: GENERAL RADIOLOGY | Age: 34
Discharge: HOME | End: 2019-07-31

## 2019-07-29 ENCOUNTER — OFFICE VISIT (OUTPATIENT)
Dept: ORTHOPEDIC SURGERY | Age: 34
End: 2019-07-29

## 2019-07-29 VITALS
WEIGHT: 220 LBS | HEIGHT: 63 IN | TEMPERATURE: 97.9 F | SYSTOLIC BLOOD PRESSURE: 125 MMHG | BODY MASS INDEX: 38.98 KG/M2 | HEART RATE: 100 BPM | DIASTOLIC BLOOD PRESSURE: 81 MMHG

## 2019-07-29 DIAGNOSIS — S62.633D OPEN DISPLACED FRACTURE OF DISTAL PHALANX OF LEFT MIDDLE FINGER WITH ROUTINE HEALING, SUBSEQUENT ENCOUNTER: ICD-10-CM

## 2019-07-29 DIAGNOSIS — S62.633D OPEN DISPLACED FRACTURE OF DISTAL PHALANX OF LEFT MIDDLE FINGER WITH ROUTINE HEALING, SUBSEQUENT ENCOUNTER: Primary | ICD-10-CM

## 2019-07-29 PROCEDURE — 73130 X-RAY EXAM OF HAND: CPT

## 2019-07-29 PROCEDURE — 97530 THERAPEUTIC ACTIVITIES: CPT

## 2019-07-29 PROCEDURE — 99024 POSTOP FOLLOW-UP VISIT: CPT | Performed by: NURSE PRACTITIONER

## 2019-07-29 PROCEDURE — 99212 OFFICE O/P EST SF 10 MIN: CPT | Performed by: NURSE PRACTITIONER

## 2019-07-29 PROCEDURE — 97140 MANUAL THERAPY 1/> REGIONS: CPT

## 2019-07-29 PROCEDURE — 97110 THERAPEUTIC EXERCISES: CPT

## 2019-07-29 NOTE — PROGRESS NOTES
BMI 38.97 kg/m²     XR: 3 views of the left hand redemonstrating a comminuted fracture of the distal phalanx of the left third digit. No acute fracture or dislocation. No other osseous abnormalities noted. Assessment:   Diagnosis Orders   1. Open displaced fracture of distal phalanx of left middle finger with routine healing, subsequent encounter         Plan:  Continue OT through [facility]  Continue to wear the stack splint at work. Continue light duty for another 6 weeks and we can re evaluate at the time if able to return to full duty. OTC analgesics as needed for pain   Follow up in 6 weeks. Call sooner if problems or concerns.      Electronically signed by LINDA Tabares CNP on 7/29/2019 at 4:30 PM

## 2019-08-01 ENCOUNTER — HOSPITAL ENCOUNTER (OUTPATIENT)
Dept: OCCUPATIONAL THERAPY | Age: 34
Setting detail: THERAPIES SERIES
Discharge: HOME | End: 2019-08-01
Payer: COMMERCIAL

## 2019-08-01 PROCEDURE — 97530 THERAPEUTIC ACTIVITIES: CPT

## 2019-08-01 PROCEDURE — 97110 THERAPEUTIC EXERCISES: CPT

## 2019-08-01 NOTE — PROGRESS NOTES
Pinch and release and find pickup x 8 min Cotton balls/sponges   wristosizer      FM x 10 min Graded particle manipulation   Desentization x 10 min    Putty and beads x 8 min Pinch with middle finger and thumb to locate beads for touch   Rice and item task for desentization  15 min                            ROM: x 20 min AAROM, PROM, AROM tendon glides               Other:      Coban Wrap   Patient education on wear/care she is to wrap the entire finger   Stretch digit sleeve x  Fitted patient for new compression cap     Assessment: Patient shows potential to progress with stated goals and will be educated on HEP and provided written handouts as needed throughout their care. Pt is making Fair progress toward stated plan of care. -Rehab Potential: Good  -Requires OT Follow Up: Yes  -Time In: 300  -Time Out: 400   Timed Treatment Minutes: 55 Minutes    Goals:   1) Patient will demonstrate good understanding of home program (exercises/activities/diagnosis/prognosis/goals) with good accuracy. Progressing  2) Patient will demonstrate increased active/passive range of motion of their middle and ring fingers to [facility] for ADL/IADL completion. Middle DIP flexion =  51'(57)    Initial = 0'  3) Patient will demonstrate increased /pinch strength of at least 10 / 5 pinch pounds of their l hand.(when protocol permitting)    TBA  4) Patient to report decreased pain in their affected R hand from 0-4/10 to 0-2/10 or less with resistive functional use. Progressing slowly  5) Increase in fine motor function as evidenced by decreased time to complete 9-hole peg test and/or MRMT test by at least 10 seconds. Progressing   6) Patient to report 100% compliance with their splint wear, care, and precautions if needed. Progressing  7) Patient to report a decrease in hypersensitivity from 70% to 20% or less in their left middle , ring tips.     Slow Progress  8) Patient will report ADL functions same as prior to diagnosis of finger fx. TBA     Plan:   [x]  Continues Plan of care: Treatment delivered based on POC and graduated to patient's progress. Patient education continues at each visit to obtain maximum benefit from skilled OT intervention.     []  Alter Plan of Care:  []  Discharge:    NENITA Barton, [id number]

## 2019-08-05 ENCOUNTER — HOSPITAL ENCOUNTER (OUTPATIENT)
Dept: OCCUPATIONAL THERAPY | Age: 34
Setting detail: THERAPIES SERIES
Discharge: HOME | End: 2019-08-05
Payer: COMMERCIAL

## 2019-08-05 PROCEDURE — 97110 THERAPEUTIC EXERCISES: CPT

## 2019-08-05 PROCEDURE — 97530 THERAPEUTIC ACTIVITIES: CPT

## 2019-08-05 PROCEDURE — 97140 MANUAL THERAPY 1/> REGIONS: CPT

## 2019-08-05 NOTE — PROGRESS NOTES
OCCUPATIONAL THERAPY PROGRESS NOTE       JAYY Smith0 Jennifer Cisneros   Phone: [phone]   Fax: [phone]     Date:  2019  Initial Evaluation Date:  6/3/2019    Patient Name:  Ling Pacheco    :  1985  Restrictions/Precautions:  Limited WB, 1#, , low fall risk  Diagnosis:  Open displaced fx of distal phalanx of left middle finger with routine healing (S62.633D)                                                       Insurance/Certification information:  [employer], comp one [id number]  Referring Practitioner:  Servando Camp PA-C  Date of Surgery/Injury: 19   5 weeks out at time of eval.   Plan of care signed (Y/N):  no  Visit# / total visits:  to 2019 TO 2019  Missed Visits: 0     Pain Level: 1-2 on scale of 1-10, with paresthesia , aching, tender to touch on tip  Increases to 4-5/10 if bumped or rolled on in the night. Subjective:  Seen 1/2 scheduled visits. Patient reporting the same pain as previous session. Pain only when bumped. ROM is slowly progressing. HEP in place. Per physician fracture is align but NOT healed. She is out for another 5 weeks on light duty. We will continue skilled OT intervention. Digit is very tender and ROM is slowly improving. Patient involved in goal planning and understands POC. Objective:  Engaged patient in the following with focus on ROM, edema control, desentization. Stack splint adjusted for fit and comfort. To be worn AAT for light duty work. Can be removed when resting at home. PRN for HS. INTERVENTION: COMPLETED REPS/TIME: SPECIFICS/COMMENTS:   Modality:      Fluidotherapy x 15 min    Paraffin      MHP  10 During SROM   Estim      Manual Therapy:      Scar Massage x 8 Assessment of digit caps utilized at this time for work. Soft Tissue:  10    Therapeutic Ex/Activities:             Towel task x 20 reps with wt 1#    Pinch and release and find pickup  8 min Cotton balls/sponges   wristosizer      FM x 8 min Graded particle manipulation   Desentization x 10 min    Putty and beads x 8 min Pinch with middle finger and thumb to locate beads for touch   Rice and item task for desentization  15 min                            ROM: x 15 min AAROM, PROM, AROM tendon glides  CRC               Other:      Coban Wrap   Patient education on wear/care she is to wrap the entire finger   Stretch digit sleeve x  Fitted patient for new compression cap     Assessment: Patient shows potential to progress with stated goals and will be educated on HEP and provided written handouts as needed throughout their care. Pt is making Fair progress toward stated plan of care. -Rehab Potential: Good  -Requires OT Follow Up: Yes  -Time In: 300  -Time Out: 400   Timed Treatment Minutes: 55 Minutes    Goals:   1) Patient will demonstrate good understanding of home program (exercises/activities/diagnosis/prognosis/goals) with good accuracy. Progressing  2) Patient will demonstrate increased active/passive range of motion of their middle and ring fingers to [facility]/IADL completion. Middle DIP flexion =  51'(57) > 46' today    Initial = 0'  3) Patient will demonstrate increased /pinch strength of at least 10 / 5 pinch pounds of their l hand.(when protocol permitting)    TBA  4) Patient to report decreased pain in their affected R hand from 0-4/10 to 0-2/10 or less with resistive functional use. Progressing slowly  5) Increase in fine motor function as evidenced by decreased time to complete 9-hole peg test and/or MRMT test by at least 10 seconds. Progressing   6) Patient to report 100% compliance with their splint wear, care, and precautions if needed. Progressing  7) Patient to report a decrease in hypersensitivity from 70% to 20% or less in their left middle , ring tips. Slow Progress  8) Patient will report ADL functions same as prior to diagnosis of finger fx.    TBA     Plan: [x]  Continues Plan of care: Treatment delivered based on POC and graduated to patient's progress. Patient education continues at each visit to obtain maximum benefit from skilled OT intervention.     []  Alter Plan of Care:  []  Discharge:    NENITA Melvin, [id number]

## 2019-08-08 ENCOUNTER — APPOINTMENT (OUTPATIENT)
Dept: OCCUPATIONAL THERAPY | Age: 34
End: 2019-08-08
Payer: COMMERCIAL

## 2019-08-08 ENCOUNTER — HOSPITAL ENCOUNTER (OUTPATIENT)
Dept: OCCUPATIONAL THERAPY | Age: 34
Setting detail: THERAPIES SERIES
Discharge: HOME | End: 2019-08-08
Payer: COMMERCIAL

## 2019-08-08 PROCEDURE — 97110 THERAPEUTIC EXERCISES: CPT

## 2019-08-08 PROCEDURE — 97022 WHIRLPOOL THERAPY: CPT

## 2019-08-08 PROCEDURE — 97530 THERAPEUTIC ACTIVITIES: CPT

## 2019-08-12 ENCOUNTER — HOSPITAL ENCOUNTER (OUTPATIENT)
Dept: OCCUPATIONAL THERAPY | Age: 34
Setting detail: THERAPIES SERIES
Discharge: HOME | End: 2019-08-12
Payer: COMMERCIAL

## 2019-08-12 ENCOUNTER — APPOINTMENT (OUTPATIENT)
Dept: OCCUPATIONAL THERAPY | Age: 34
End: 2019-08-12
Payer: COMMERCIAL

## 2019-08-12 PROCEDURE — 97530 THERAPEUTIC ACTIVITIES: CPT

## 2019-08-12 PROCEDURE — 97140 MANUAL THERAPY 1/> REGIONS: CPT

## 2019-08-12 PROCEDURE — 97110 THERAPEUTIC EXERCISES: CPT

## 2019-08-12 PROCEDURE — 97022 WHIRLPOOL THERAPY: CPT

## 2019-08-15 ENCOUNTER — APPOINTMENT (OUTPATIENT)
Dept: OCCUPATIONAL THERAPY | Age: 34
End: 2019-08-15
Payer: COMMERCIAL

## 2019-08-15 ENCOUNTER — HOSPITAL ENCOUNTER (OUTPATIENT)
Dept: OCCUPATIONAL THERAPY | Age: 34
Setting detail: THERAPIES SERIES
Discharge: HOME | End: 2019-08-15
Payer: COMMERCIAL

## 2019-08-15 PROCEDURE — 97110 THERAPEUTIC EXERCISES: CPT

## 2019-08-15 PROCEDURE — 97140 MANUAL THERAPY 1/> REGIONS: CPT

## 2019-08-15 PROCEDURE — 97530 THERAPEUTIC ACTIVITIES: CPT

## 2019-08-19 ENCOUNTER — HOSPITAL ENCOUNTER (OUTPATIENT)
Dept: OCCUPATIONAL THERAPY | Age: 34
Setting detail: THERAPIES SERIES
Discharge: HOME | End: 2019-08-19
Payer: COMMERCIAL

## 2019-08-19 PROCEDURE — 97140 MANUAL THERAPY 1/> REGIONS: CPT

## 2019-08-19 PROCEDURE — 97110 THERAPEUTIC EXERCISES: CPT

## 2019-08-19 PROCEDURE — 97530 THERAPEUTIC ACTIVITIES: CPT

## 2019-08-22 ENCOUNTER — HOSPITAL ENCOUNTER (OUTPATIENT)
Dept: OCCUPATIONAL THERAPY | Age: 34
Setting detail: THERAPIES SERIES
Discharge: HOME | End: 2019-08-22
Payer: COMMERCIAL

## 2019-08-22 PROCEDURE — 97022 WHIRLPOOL THERAPY: CPT

## 2019-08-22 PROCEDURE — 97140 MANUAL THERAPY 1/> REGIONS: CPT

## 2019-08-22 PROCEDURE — 97530 THERAPEUTIC ACTIVITIES: CPT

## 2019-08-22 PROCEDURE — 97110 THERAPEUTIC EXERCISES: CPT

## 2019-08-22 NOTE — PROGRESS NOTES
Progress  8) Patient will report ADL functions same as prior to diagnosis of finger fx. TBA     Plan:   [x]  Continues Plan of care: Treatment delivered based on POC and graduated to patient's progress. Patient education continues at each visit to obtain maximum benefit from skilled OT intervention.     [x]  Alter Plan of Care: REQUESTING ADDITIONAL C9 IF PHYSICIAN AGREES TO CONTINUE SKILLED OT INTERVENTION    []  Discharge:    MELISSA Infante/L, [id number]    Submitting for more OT   Electronically signed by Jhoana Coulter PA-C on 9/5/2019 at 4:25 PM

## 2019-09-05 DIAGNOSIS — S62.633D OPEN DISPLACED FRACTURE OF DISTAL PHALANX OF LEFT MIDDLE FINGER WITH ROUTINE HEALING, SUBSEQUENT ENCOUNTER: Primary | ICD-10-CM

## 2019-09-10 DIAGNOSIS — S62.633D OPEN DISPLACED FRACTURE OF DISTAL PHALANX OF LEFT MIDDLE FINGER WITH ROUTINE HEALING, SUBSEQUENT ENCOUNTER: Primary | ICD-10-CM

## 2019-09-11 ENCOUNTER — OFFICE VISIT (OUTPATIENT)
Dept: ORTHOPEDIC SURGERY | Age: 34
End: 2019-09-11
Payer: COMMERCIAL

## 2019-09-11 ENCOUNTER — HOSPITAL ENCOUNTER (OUTPATIENT)
Dept: GENERAL RADIOLOGY | Age: 34
Discharge: HOME | End: 2019-09-13
Payer: COMMERCIAL

## 2019-09-11 VITALS
BODY MASS INDEX: 38.98 KG/M2 | HEIGHT: 63 IN | WEIGHT: 220 LBS | SYSTOLIC BLOOD PRESSURE: 125 MMHG | RESPIRATION RATE: 16 BRPM | HEART RATE: 77 BPM | DIASTOLIC BLOOD PRESSURE: 89 MMHG

## 2019-09-11 DIAGNOSIS — S62.633D OPEN DISPLACED FRACTURE OF DISTAL PHALANX OF LEFT MIDDLE FINGER WITH ROUTINE HEALING, SUBSEQUENT ENCOUNTER: Primary | ICD-10-CM

## 2019-09-11 DIAGNOSIS — S62.633D OPEN DISPLACED FRACTURE OF DISTAL PHALANX OF LEFT MIDDLE FINGER WITH ROUTINE HEALING, SUBSEQUENT ENCOUNTER: ICD-10-CM

## 2019-09-11 PROCEDURE — 99212 OFFICE O/P EST SF 10 MIN: CPT

## 2019-09-11 PROCEDURE — 73130 X-RAY EXAM OF HAND: CPT

## 2019-09-11 PROCEDURE — 99212 OFFICE O/P EST SF 10 MIN: CPT | Performed by: NURSE PRACTITIONER

## 2019-09-11 NOTE — PATIENT INSTRUCTIONS
Referral sent to continue OT with [facility] ([facility]), working on continued ROM and desensitization of the left middle finger. Letter provided to stay out of work for 4 weeks to see if there is improvement in ROM and decreased sensitivity to the finger.   OTC analgesics as needed for pain control  Wear the stack splint to the finger if doing any activities that could possibly cause injury to the finger  If still no improvement when we see you back in 4 weeks we will send you to Dr. Whitman    We will see her back in 4 weeks to reassess her symptoms on 10-9-2019 at 9 am

## 2019-09-17 ENCOUNTER — HOSPITAL ENCOUNTER (OUTPATIENT)
Dept: OCCUPATIONAL THERAPY | Age: 34
Setting detail: THERAPIES SERIES
Discharge: HOME | End: 2019-09-17
Payer: COMMERCIAL

## 2019-09-17 NOTE — PROGRESS NOTES
OCCUPATIONAL THERAPY PROGRESS NOTE       [facility] [address] [facility]              Phone: [phone]             Fax: [phone]      Date:  2019                     Initial Evaluation Date:  6/3/2019     Patient Name:  Shady Reason                  :  1985  Restrictions/Precautions:  Limited WB, 1#, low fall risk  Diagnosis:  Open displaced fx of distal phalanx of left middle finger with routine healing (S54.823E)                                                       Insurance/Certification information:  [employer] [facility], comp one [id number]  Referring Unknown VELMA Tenorio / Dr Ernestine Guerrero  Date of Surgery/Injury: 19   5 weeks out at time of eval.   Plan of care signed (Y/N):  no  Visit# / total visits: none authorized at this time  10 / 12 to 2019 TO 2019               Missed Visits: 0             Patient arrived for treatment. No C9 on file. Explained that original C9 has  and that she would have to pay out of pocket. She opted to cancel OT session today. She will call us when she hears from Dr. Ernestine Guerrero office regarding C9 status.         MELISSA Gruber/SUMAN [id number]

## 2019-09-19 ENCOUNTER — APPOINTMENT (OUTPATIENT)
Dept: OCCUPATIONAL THERAPY | Age: 34
End: 2019-09-19
Payer: COMMERCIAL

## 2019-09-27 ENCOUNTER — HOSPITAL ENCOUNTER (OUTPATIENT)
Dept: OCCUPATIONAL THERAPY | Age: 34
Setting detail: THERAPIES SERIES
Discharge: HOME | End: 2019-09-27
Payer: COMMERCIAL

## 2019-09-27 PROCEDURE — 97530 THERAPEUTIC ACTIVITIES: CPT

## 2019-09-27 PROCEDURE — 97022 WHIRLPOOL THERAPY: CPT

## 2019-09-27 PROCEDURE — 97110 THERAPEUTIC EXERCISES: CPT

## 2019-09-30 ENCOUNTER — HOSPITAL ENCOUNTER (OUTPATIENT)
Dept: OCCUPATIONAL THERAPY | Age: 34
Setting detail: THERAPIES SERIES
Discharge: HOME | End: 2019-09-30
Payer: COMMERCIAL

## 2019-09-30 PROCEDURE — 97110 THERAPEUTIC EXERCISES: CPT

## 2019-09-30 PROCEDURE — 97022 WHIRLPOOL THERAPY: CPT

## 2019-09-30 PROCEDURE — 97530 THERAPEUTIC ACTIVITIES: CPT

## 2019-09-30 PROCEDURE — 97140 MANUAL THERAPY 1/> REGIONS: CPT

## 2019-10-02 ENCOUNTER — HOSPITAL ENCOUNTER (OUTPATIENT)
Dept: OCCUPATIONAL THERAPY | Age: 34
Setting detail: THERAPIES SERIES
Discharge: HOME | End: 2019-10-02
Payer: COMMERCIAL

## 2019-10-02 PROCEDURE — 97110 THERAPEUTIC EXERCISES: CPT

## 2019-10-02 PROCEDURE — 97530 THERAPEUTIC ACTIVITIES: CPT

## 2019-10-02 PROCEDURE — 97140 MANUAL THERAPY 1/> REGIONS: CPT

## 2019-10-07 ENCOUNTER — HOSPITAL ENCOUNTER (OUTPATIENT)
Dept: OCCUPATIONAL THERAPY | Age: 34
Setting detail: THERAPIES SERIES
Discharge: HOME | End: 2019-10-07
Payer: COMMERCIAL

## 2019-10-07 DIAGNOSIS — S62.633D OPEN DISPLACED FRACTURE OF DISTAL PHALANX OF LEFT MIDDLE FINGER WITH ROUTINE HEALING, SUBSEQUENT ENCOUNTER: Primary | ICD-10-CM

## 2019-10-09 ENCOUNTER — HOSPITAL ENCOUNTER (OUTPATIENT)
Dept: GENERAL RADIOLOGY | Age: 34
Discharge: HOME | End: 2019-10-11
Payer: COMMERCIAL

## 2019-10-09 ENCOUNTER — OFFICE VISIT (OUTPATIENT)
Dept: ORTHOPEDIC SURGERY | Age: 34
End: 2019-10-09
Payer: COMMERCIAL

## 2019-10-09 ENCOUNTER — HOSPITAL ENCOUNTER (OUTPATIENT)
Dept: OCCUPATIONAL THERAPY | Age: 34
Setting detail: THERAPIES SERIES
Discharge: HOME | End: 2019-10-09
Payer: COMMERCIAL

## 2019-10-09 VITALS — BODY MASS INDEX: 38.98 KG/M2 | HEART RATE: 80 BPM | OXYGEN SATURATION: 97 % | WEIGHT: 220 LBS | HEIGHT: 63 IN

## 2019-10-09 DIAGNOSIS — S62.633K: Primary | ICD-10-CM

## 2019-10-09 DIAGNOSIS — S62.633D: ICD-10-CM

## 2019-10-09 PROCEDURE — 99213 OFFICE O/P EST LOW 20 MIN: CPT | Performed by: PHYSICIAN ASSISTANT

## 2019-10-09 PROCEDURE — 97530 THERAPEUTIC ACTIVITIES: CPT

## 2019-10-09 PROCEDURE — 97022 WHIRLPOOL THERAPY: CPT

## 2019-10-09 PROCEDURE — 99212 OFFICE O/P EST SF 10 MIN: CPT | Performed by: PHYSICIAN ASSISTANT

## 2019-10-09 PROCEDURE — 97110 THERAPEUTIC EXERCISES: CPT

## 2019-10-09 PROCEDURE — 73130 X-RAY EXAM OF HAND: CPT

## 2019-10-16 ENCOUNTER — TELEPHONE (OUTPATIENT)
Dept: ORTHOPEDIC SURGERY | Age: 34
End: 2019-10-16

## 2019-10-16 ENCOUNTER — HOSPITAL ENCOUNTER (OUTPATIENT)
Dept: OCCUPATIONAL THERAPY | Age: 34
Setting detail: THERAPIES SERIES
Discharge: HOME | End: 2019-10-16
Payer: COMMERCIAL

## 2019-10-16 DIAGNOSIS — S62.633K: ICD-10-CM

## 2019-10-16 DIAGNOSIS — S62.633D OPEN DISPLACED FRACTURE OF DISTAL PHALANX OF LEFT MIDDLE FINGER WITH ROUTINE HEALING, SUBSEQUENT ENCOUNTER: Primary | ICD-10-CM

## 2019-10-16 PROCEDURE — 97110 THERAPEUTIC EXERCISES: CPT

## 2019-10-16 PROCEDURE — 97022 WHIRLPOOL THERAPY: CPT

## 2019-10-16 PROCEDURE — 97530 THERAPEUTIC ACTIVITIES: CPT

## 2019-10-16 RX ORDER — IBUPROFEN 800 MG/1
800 TABLET ORAL EVERY 8 HOURS PRN
Qty: 60 TABLET | Refills: 0 | Status: SHIPPED | OUTPATIENT
Start: 2019-10-16 | End: 2021-10-08 | Stop reason: SDUPTHER

## 2019-10-21 ENCOUNTER — HOSPITAL ENCOUNTER (OUTPATIENT)
Dept: OCCUPATIONAL THERAPY | Age: 34
Setting detail: THERAPIES SERIES
Discharge: HOME | End: 2019-10-21
Payer: COMMERCIAL

## 2019-10-21 PROCEDURE — 97530 THERAPEUTIC ACTIVITIES: CPT

## 2019-10-21 PROCEDURE — 97110 THERAPEUTIC EXERCISES: CPT

## 2019-10-23 ENCOUNTER — APPOINTMENT (OUTPATIENT)
Dept: OCCUPATIONAL THERAPY | Age: 34
End: 2019-10-23
Payer: COMMERCIAL

## 2019-10-28 ENCOUNTER — APPOINTMENT (OUTPATIENT)
Dept: OCCUPATIONAL THERAPY | Age: 34
End: 2019-10-28
Payer: COMMERCIAL

## 2019-10-30 ENCOUNTER — APPOINTMENT (OUTPATIENT)
Dept: OCCUPATIONAL THERAPY | Age: 34
End: 2019-10-30
Payer: COMMERCIAL

## 2019-11-13 ENCOUNTER — TELEPHONE (OUTPATIENT)
Dept: ORTHOPEDIC SURGERY | Age: 34
End: 2019-11-13

## 2019-11-13 DIAGNOSIS — S62.633D OPEN DISPLACED FRACTURE OF DISTAL PHALANX OF LEFT MIDDLE FINGER WITH ROUTINE HEALING, SUBSEQUENT ENCOUNTER: Primary | ICD-10-CM

## 2019-11-14 ENCOUNTER — OFFICE VISIT (OUTPATIENT)
Dept: ORTHOPEDIC SURGERY | Age: 34
End: 2019-11-14
Payer: COMMERCIAL

## 2019-11-14 VITALS
HEIGHT: 64 IN | DIASTOLIC BLOOD PRESSURE: 75 MMHG | SYSTOLIC BLOOD PRESSURE: 119 MMHG | WEIGHT: 220 LBS | RESPIRATION RATE: 18 BRPM | BODY MASS INDEX: 37.56 KG/M2 | HEART RATE: 77 BPM

## 2019-11-14 DIAGNOSIS — S62.633K: Primary | ICD-10-CM

## 2019-11-14 PROCEDURE — 99214 OFFICE O/P EST MOD 30 MIN: CPT | Performed by: ORTHOPAEDIC SURGERY

## 2021-10-07 LAB
ACETAMINOPHEN LEVEL: <5 MCG/ML (ref 10–30)
ALBUMIN SERPL-MCNC: 3.9 G/DL (ref 3.5–5.2)
ALP BLD-CCNC: 54 U/L (ref 35–104)
ALT SERPL-CCNC: 10 U/L (ref 0–32)
AMPHETAMINE SCREEN, URINE: NOT DETECTED
ANION GAP SERPL CALCULATED.3IONS-SCNC: 14 MMOL/L (ref 7–16)
AST SERPL-CCNC: 15 U/L (ref 0–31)
BACTERIA: ABNORMAL /HPF
BARBITURATE SCREEN URINE: NOT DETECTED
BASOPHILS ABSOLUTE: 0.07 E9/L (ref 0–0.2)
BASOPHILS RELATIVE PERCENT: 0.6 % (ref 0–2)
BENZODIAZEPINE SCREEN, URINE: NOT DETECTED
BILIRUB SERPL-MCNC: 0.3 MG/DL (ref 0–1.2)
BILIRUBIN URINE: NEGATIVE
BLOOD, URINE: ABNORMAL
BUN BLDV-MCNC: 14 MG/DL (ref 6–20)
CALCIUM SERPL-MCNC: 8 MG/DL (ref 8.6–10.2)
CANNABINOID SCREEN URINE: NOT DETECTED
CHLORIDE BLD-SCNC: 89 MMOL/L (ref 98–107)
CLARITY: CLEAR
CO2: 21 MMOL/L (ref 22–29)
COCAINE METABOLITE SCREEN URINE: NOT DETECTED
COLOR: YELLOW
CREAT SERPL-MCNC: 1.2 MG/DL (ref 0.5–1)
EOSINOPHILS ABSOLUTE: 0.27 E9/L (ref 0.05–0.5)
EOSINOPHILS RELATIVE PERCENT: 2.5 % (ref 0–6)
EPITHELIAL CELLS, UA: ABNORMAL /HPF
ETHANOL: <10 MG/DL (ref 0–0.08)
FENTANYL SCREEN, URINE: NOT DETECTED
GFR AFRICAN AMERICAN: >60
GFR NON-AFRICAN AMERICAN: >60 ML/MIN/1.73
GLUCOSE BLD-MCNC: 107 MG/DL (ref 74–99)
GLUCOSE URINE: NEGATIVE MG/DL
HCG, URINE, POC: NEGATIVE
HCT VFR BLD CALC: 35.9 % (ref 34–48)
HEMOGLOBIN: 11.8 G/DL (ref 11.5–15.5)
IMMATURE GRANULOCYTES #: 0.03 E9/L
IMMATURE GRANULOCYTES %: 0.3 % (ref 0–5)
KETONES, URINE: NEGATIVE MG/DL
LACTIC ACID: 1.1 MMOL/L (ref 0.5–2.2)
LEUKOCYTE ESTERASE, URINE: ABNORMAL
LYMPHOCYTES ABSOLUTE: 4.73 E9/L (ref 1.5–4)
LYMPHOCYTES RELATIVE PERCENT: 43.9 % (ref 20–42)
Lab: NORMAL
Lab: NORMAL
MCH RBC QN AUTO: 31.5 PG (ref 26–35)
MCHC RBC AUTO-ENTMCNC: 32.9 % (ref 32–34.5)
MCV RBC AUTO: 95.7 FL (ref 80–99.9)
METHADONE SCREEN, URINE: NOT DETECTED
MONOCYTES ABSOLUTE: 0.67 E9/L (ref 0.1–0.95)
MONOCYTES RELATIVE PERCENT: 6.2 % (ref 2–12)
NEGATIVE QC PASS/FAIL: NORMAL
NEUTROPHILS ABSOLUTE: 5 E9/L (ref 1.8–7.3)
NEUTROPHILS RELATIVE PERCENT: 46.5 % (ref 43–80)
NITRITE, URINE: NEGATIVE
OPIATE SCREEN URINE: NOT DETECTED
OXYCODONE URINE: NOT DETECTED
PDW BLD-RTO: 13.3 FL (ref 11.5–15)
PH UA: 5.5 (ref 5–9)
PHENCYCLIDINE SCREEN URINE: NOT DETECTED
PLATELET # BLD: 296 E9/L (ref 130–450)
PMV BLD AUTO: 10.2 FL (ref 7–12)
POSITIVE QC PASS/FAIL: NORMAL
POTASSIUM SERPL-SCNC: 3.1 MMOL/L (ref 3.5–5)
PROTEIN UA: NEGATIVE MG/DL
RBC # BLD: 3.75 E12/L (ref 3.5–5.5)
RBC UA: ABNORMAL /HPF (ref 0–2)
SALICYLATE, SERUM: <0.3 MG/DL (ref 0–30)
SODIUM BLD-SCNC: 124 MMOL/L (ref 132–146)
SPECIFIC GRAVITY UA: <=1.005 (ref 1–1.03)
TOTAL PROTEIN: 6.6 G/DL (ref 6.4–8.3)
TRICYCLIC ANTIDEPRESSANTS SCREEN SERUM: NEGATIVE NG/ML
TROPONIN, HIGH SENSITIVITY: <6 NG/L (ref 0–9)
UROBILINOGEN, URINE: 0.2 E.U./DL
WBC # BLD: 10.8 E9/L (ref 4.5–11.5)
WBC UA: ABNORMAL /HPF (ref 0–5)

## 2021-10-07 PROCEDURE — 85025 COMPLETE CBC W/AUTO DIFF WBC: CPT

## 2021-10-07 PROCEDURE — 80053 COMPREHEN METABOLIC PANEL: CPT

## 2021-10-07 PROCEDURE — 84484 ASSAY OF TROPONIN QUANT: CPT

## 2021-10-07 PROCEDURE — 80179 DRUG ASSAY SALICYLATE: CPT

## 2021-10-07 PROCEDURE — 80143 DRUG ASSAY ACETAMINOPHEN: CPT

## 2021-10-07 PROCEDURE — 83605 ASSAY OF LACTIC ACID: CPT

## 2021-10-07 PROCEDURE — 81001 URINALYSIS AUTO W/SCOPE: CPT

## 2021-10-07 PROCEDURE — 99283 EMERGENCY DEPT VISIT LOW MDM: CPT

## 2021-10-07 PROCEDURE — 82077 ASSAY SPEC XCP UR&BREATH IA: CPT

## 2021-10-07 PROCEDURE — 80307 DRUG TEST PRSMV CHEM ANLYZR: CPT

## 2021-10-07 RX ORDER — SODIUM CHLORIDE 9 MG/ML
INJECTION, SOLUTION INTRAVENOUS CONTINUOUS
Status: DISCONTINUED | OUTPATIENT
Start: 2021-10-07 | End: 2021-10-08 | Stop reason: HOSPADM

## 2021-10-08 ENCOUNTER — HOSPITAL ENCOUNTER (EMERGENCY)
Age: 36
Discharge: HOME | End: 2021-10-08
Attending: EMERGENCY MEDICINE

## 2021-10-08 VITALS
SYSTOLIC BLOOD PRESSURE: 112 MMHG | HEART RATE: 80 BPM | WEIGHT: 220 LBS | DIASTOLIC BLOOD PRESSURE: 74 MMHG | HEIGHT: 64 IN | BODY MASS INDEX: 37.56 KG/M2 | TEMPERATURE: 98.4 F | RESPIRATION RATE: 20 BRPM | OXYGEN SATURATION: 98 %

## 2021-10-08 DIAGNOSIS — K08.89 TOOTHACHE: Primary | ICD-10-CM

## 2021-10-08 DIAGNOSIS — S62.633K: ICD-10-CM

## 2021-10-08 DIAGNOSIS — S62.633D OPEN DISPLACED FRACTURE OF DISTAL PHALANX OF LEFT MIDDLE FINGER WITH ROUTINE HEALING, SUBSEQUENT ENCOUNTER: ICD-10-CM

## 2021-10-08 DIAGNOSIS — E87.1 HYPONATREMIA: ICD-10-CM

## 2021-10-08 LAB
ANION GAP SERPL CALCULATED.3IONS-SCNC: 9 MMOL/L (ref 7–16)
BUN BLDV-MCNC: 12 MG/DL (ref 6–20)
CALCIUM SERPL-MCNC: 9.2 MG/DL (ref 8.6–10.2)
CHLORIDE BLD-SCNC: 92 MMOL/L (ref 98–107)
CO2: 25 MMOL/L (ref 22–29)
CREAT SERPL-MCNC: 1.1 MG/DL (ref 0.5–1)
GFR AFRICAN AMERICAN: >60
GFR NON-AFRICAN AMERICAN: >60 ML/MIN/1.73
GLUCOSE BLD-MCNC: 101 MG/DL (ref 74–99)
POTASSIUM SERPL-SCNC: 3.9 MMOL/L (ref 3.5–5)
SODIUM BLD-SCNC: 126 MMOL/L (ref 132–146)

## 2021-10-08 PROCEDURE — 6370000000 HC RX 637 (ALT 250 FOR IP): Performed by: EMERGENCY MEDICINE

## 2021-10-08 PROCEDURE — 80048 BASIC METABOLIC PNL TOTAL CA: CPT

## 2021-10-08 PROCEDURE — 93005 ELECTROCARDIOGRAM TRACING: CPT | Performed by: EMERGENCY MEDICINE

## 2021-10-08 PROCEDURE — 2580000003 HC RX 258: Performed by: EMERGENCY MEDICINE

## 2021-10-08 PROCEDURE — 36415 COLL VENOUS BLD VENIPUNCTURE: CPT

## 2021-10-08 RX ORDER — POTASSIUM CHLORIDE 20 MEQ/1
40 TABLET, EXTENDED RELEASE ORAL ONCE
Status: COMPLETED | OUTPATIENT
Start: 2021-10-08 | End: 2021-10-08

## 2021-10-08 RX ORDER — IBUPROFEN 800 MG/1
800 TABLET ORAL EVERY 8 HOURS PRN
Qty: 15 TABLET | Refills: 0 | Status: SHIPPED | OUTPATIENT
Start: 2021-10-08 | End: 2021-10-13

## 2021-10-08 RX ORDER — PENICILLIN V POTASSIUM 500 MG/1
500 TABLET ORAL 4 TIMES DAILY
Qty: 40 TABLET | Refills: 0 | Status: SHIPPED | OUTPATIENT
Start: 2021-10-08 | End: 2021-10-18

## 2021-10-08 RX ADMIN — SODIUM CHLORIDE: 9 INJECTION, SOLUTION INTRAVENOUS at 01:47

## 2021-10-08 RX ADMIN — POTASSIUM CHLORIDE 40 MEQ: 20 TABLET, EXTENDED RELEASE ORAL at 02:52

## 2021-10-08 NOTE — ED NOTES
Patient states she is unable to provide urine specimen at this time.      Jose Vazquez LPN  84/27/68 [id number]

## 2021-10-08 NOTE — ED PROVIDER NOTES
HPI:  10/8/21, Time: 1:14 AM EDT         Abdi Lancaster is a 43 y.o. female presenting to the ED for patient was drinking water reports she was feeling bloated and hands were swelling, beginning 1 day ago. The complaint has been persistent, moderate in severity, and worsened by nothing. She reports she was dealing with a toothache and was drinking lots of water. Patient reports receiving her tooth. Patient reporting no fever no chest pain she reports no abdominal pain or vomiting or diarrhea she reports since she has been out in the waiting room she has been urinating and feels much better now. The swelling in her arms has resolved. Patient reporting no headache no neck pain she reports no productive cough. ROS:   Pertinent positives and negatives are stated within HPI, all other systems reviewed and are negative.  --------------------------------------------- PAST HISTORY ---------------------------------------------  Past Medical History:  has a past medical history of Asthma and Hypertension. Past Surgical History:  has a past surgical history that includes LEEP. Social History:  reports that she has been smoking cigarettes. She has been smoking about 0.50 packs per day. She has never used smokeless tobacco. She reports current alcohol use. She reports that she does not use drugs. Family History: family history is not on file. The patients home medications have been reviewed. Allergies: Patient has no known allergies.     ---------------------------------------------------PHYSICAL EXAM--------------------------------------    Constitutional/General: Alert and oriented x3, well appearing, non toxic in NAD  Head: Normocephalic and atraumatic  Eyes: PERRL, EOMI  Mouth: Oropharynx clear, handling secretions, no trismus, tenderness to right posterior lower molar wisdom tooth no swelling no drainage  Neck: Supple, full ROM, non tender to palpation in the midline, no stridor, no crepitus, no meningeal signs  Pulmonary: Lungs clear to auscultation bilaterally, no wheezes, rales, or rhonchi. Not in respiratory distress  Cardiovascular:  Regular rate. Regular rhythm. No murmurs, gallops, or rubs. 2+ distal pulses  Chest: no chest wall tenderness  Abdomen: Soft. Non tender. Non distended. +BS. No rebound, guarding, or rigidity. No pulsatile masses appreciated. Musculoskeletal: Moves all extremities x 4. Warm and well perfused, no clubbing, cyanosis, or edema. Capillary refill <3 seconds  Skin: warm and dry. No rashes. Neurologic: GCS 15, CN 2-12 grossly intact, no focal deficits, symmetric strength 5/5 in the upper and lower extremities bilaterally  Psych: Normal Affect    -------------------------------------------------- RESULTS -------------------------------------------------  I have personally reviewed all laboratory and imaging results for this patient. Results are listed below.      LABS:  Results for orders placed or performed during the hospital encounter of 10/08/21   CBC auto differential   Result Value Ref Range    WBC 10.8 4.5 - 11.5 E9/L    RBC 3.75 3.50 - 5.50 E12/L    Hemoglobin 11.8 11.5 - 15.5 g/dL    Hematocrit 35.9 34.0 - 48.0 %    MCV 95.7 80.0 - 99.9 fL    MCH 31.5 26.0 - 35.0 pg    MCHC 32.9 32.0 - 34.5 %    RDW 13.3 11.5 - 15.0 fL    Platelets 533 408 - 214 E9/L    MPV 10.2 7.0 - 12.0 fL    Neutrophils % 46.5 43.0 - 80.0 %    Immature Granulocytes % 0.3 0.0 - 5.0 %    Lymphocytes % 43.9 (H) 20.0 - 42.0 %    Monocytes % 6.2 2.0 - 12.0 %    Eosinophils % 2.5 0.0 - 6.0 %    Basophils % 0.6 0.0 - 2.0 %    Neutrophils Absolute 5.00 1.80 - 7.30 E9/L    Immature Granulocytes # 0.03 E9/L    Lymphocytes Absolute 4.73 (H) 1.50 - 4.00 E9/L    Monocytes Absolute 0.67 0.10 - 0.95 E9/L    Eosinophils Absolute 0.27 0.05 - 0.50 E9/L    Basophils Absolute 0.07 0.00 - 0.20 E9/L   Comprehensive Metabolic Panel   Result Value Ref Range    Sodium 124 (L) 132 - 146 mmol/L    Potassium 3.1 (L) 3.5 - 5.0 mmol/L    Chloride 89 (L) 98 - 107 mmol/L    CO2 21 (L) 22 - 29 mmol/L    Anion Gap 14 7 - 16 mmol/L    Glucose 107 (H) 74 - 99 mg/dL    BUN 14 6 - 20 mg/dL    CREATININE 1.2 (H) 0.5 - 1.0 mg/dL    GFR Non-African American >60 >=60 mL/min/1.73    GFR African American >60     Calcium 8.0 (L) 8.6 - 10.2 mg/dL    Total Protein 6.6 6.4 - 8.3 g/dL    Albumin 3.9 3.5 - 5.2 g/dL    Total Bilirubin 0.3 0.0 - 1.2 mg/dL    Alkaline Phosphatase 54 35 - 104 U/L    ALT 10 0 - 32 U/L    AST 15 0 - 31 U/L   Troponin   Result Value Ref Range    Troponin, High Sensitivity <6 0 - 9 ng/L   Urinalysis   Result Value Ref Range    Color, UA Yellow Straw/Yellow    Clarity, UA Clear Clear    Glucose, Ur Negative Negative mg/dL    Bilirubin Urine Negative Negative    Ketones, Urine Negative Negative mg/dL    Specific Gravity, UA <=1.005 1.005 - 1.030    Blood, Urine TRACE (A) Negative    pH, UA 5.5 5.0 - 9.0    Protein, UA Negative Negative mg/dL    Urobilinogen, Urine 0.2 <2.0 E.U./dL    Nitrite, Urine Negative Negative    Leukocyte Esterase, Urine TRACE (A) Negative   Serum Drug Screen   Result Value Ref Range    Ethanol Lvl <10 mg/dL    Acetaminophen Level <5.0 (L) 10.0 - 90.0 mcg/mL    Salicylate, Serum <6.6 0.0 - 30.0 mg/dL    TCA Scrn NEGATIVE Cutoff:300 ng/mL   Urine Drug Screen   Result Value Ref Range    Amphetamine Screen, Urine NOT DETECTED Negative <1000 ng/mL    Barbiturate Screen, Ur NOT DETECTED Negative < 200 ng/mL    Benzodiazepine Screen, Urine NOT DETECTED Negative < 200 ng/mL    Cannabinoid Scrn, Ur NOT DETECTED Negative < 50ng/mL    Cocaine Metabolite Screen, Urine NOT DETECTED Negative < 300 ng/mL    Opiate Scrn, Ur NOT DETECTED Negative < 300ng/mL    PCP Screen, Urine NOT DETECTED Negative < 25 ng/mL    Methadone Screen, Urine NOT DETECTED Negative <300 ng/mL    Oxycodone Urine NOT DETECTED Negative <100 ng/mL    FENTANYL SCREEN, URINE NOT DETECTED Negative <1 ng/mL    Drug Screen Comment: see below    Lactic Acid, Plasma   Result Value Ref Range    Lactic Acid 1.1 0.5 - 2.2 mmol/L   Microscopic Urinalysis   Result Value Ref Range    WBC, UA 1-3 0 - 5 /HPF    RBC, UA 1-3 0 - 2 /HPF    Epithelial Cells, UA FEW /HPF    Bacteria, UA FEW (A) None Seen /HPF   Basic Metabolic Panel   Result Value Ref Range    Sodium 126 (L) 132 - 146 mmol/L    Potassium 3.9 3.5 - 5.0 mmol/L    Chloride 92 (L) 98 - 107 mmol/L    CO2 25 22 - 29 mmol/L    Anion Gap 9 7 - 16 mmol/L    Glucose 101 (H) 74 - 99 mg/dL    BUN 12 6 - 20 mg/dL    CREATININE 1.1 (H) 0.5 - 1.0 mg/dL    GFR Non-African American >60 >=60 mL/min/1.73    GFR African American >60     Calcium 9.2 8.6 - 10.2 mg/dL   POC Pregnancy Urine Qual   Result Value Ref Range    HCG, Urine, POC Negative Negative    Lot Number [id number]     Positive QC Pass/Fail Pass     Negative QC Pass/Fail Pass        RADIOLOGY:  Interpreted by Radiologist.  No orders to display     EKG: This EKG is signed and interpreted by me. Rate: 64  Rhythm: Sinus  Interpretation: no acute changes  Comparison: no previous EKG available      ------------------------- NURSING NOTES AND VITALS REVIEWED ---------------------------   The nursing notes within the ED encounter and vital signs as below have been reviewed by myself. /74   Pulse 80   Temp 98.4 °F (36.9 °C) (Oral)   Resp 20   Ht 5' 4\" (1.626 m)   Wt 220 lb (99.8 kg)   SpO2 98%   BMI 37.76 kg/m²   Oxygen Saturation Interpretation: Normal    The patients available past medical records and past encounters were reviewed. ------------------------------ ED COURSE/MEDICAL DECISION MAKING----------------------  Medications   potassium chloride (KLOR-CON M) extended release tablet 40 mEq (40 mEq Oral Given 10/8/21 0252)             Medical Decision Making:    Presenting here because of excessive drinking of water. Patient reports she was drinking to see the tooth that was aching on the right lower side.   Patient reporting some swelling to her arms and notes that the swelling has resolved. Patient reporting that she has been urinating out in the waiting room. Patient reporting no chest pain no difficulty breathing no cough or fever. Patient does have reproducible pain to right posterior molar. Patient made aware of need for follow-up with dental clinic. Patient also made aware of the risks of drinking excessive amounts of water. Patient to return if symptoms worsen or persist.  Patient was given instructions in regards to hyponatremia. Re-Evaluations:             Re-evaluation. Patients symptoms show no change  Patient reevaluate no acute distress vital signs noted patient nontoxic-appearing patient made aware of findings and plan and made aware of not to drink excessive amount of water. Patient made aware of risk of low sodium. Consultations:                 Critical Care: This patient's ED course included: a personal history and physicial eaxmination    This patient has been closely monitored during their ED course. Counseling: The emergency provider has spoken with the patient and discussed todays results, in addition to providing specific details for the plan of care and counseling regarding the diagnosis and prognosis. Questions are answered at this time and they are agreeable with the plan.       --------------------------------- IMPRESSION AND DISPOSITION ---------------------------------       --------------------------------- IMPRESSION AND DISPOSITION ---------------------------------    IMPRESSION  1. Toothache    2. Hyponatremia    3. Open displaced fracture of distal phalanx of left middle finger with routine healing, subsequent encounter    4.  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 It automatically came into the chart.       --------------------------------- IMPRESSION AND DISPOSITION ---------------------------------              DISPOSITION  Disposition: Discharge to home  Patient condition is stable        NOTE: This report was transcribed using voice recognition software.  Every effort was made to ensure accuracy; however, inadvertent computerized transcription errors may be present          Oliver Ortiz MD  10/08/21 [id number]       Oliver Ortiz MD  10/08/21 [place] [address] Kortney Valera MD  10/08/21 1321

## 2021-10-08 NOTE — ED NOTES
Bed: 06  Expected date:   Expected time:   Means of arrival:   Comments:  triage     Krystal Robertson RN  10/08/21 0105

## 2021-10-11 LAB
EKG ATRIAL RATE: 64 BPM
EKG P AXIS: 0 DEGREES
EKG P-R INTERVAL: 162 MS
EKG Q-T INTERVAL: 464 MS
EKG QRS DURATION: 84 MS
EKG QTC CALCULATION (BAZETT): 478 MS
EKG R AXIS: 2 DEGREES
EKG T AXIS: 4 DEGREES
EKG VENTRICULAR RATE: 64 BPM

## 2021-10-11 PROCEDURE — 93010 ELECTROCARDIOGRAM REPORT: CPT | Performed by: INTERNAL MEDICINE
